# Patient Record
Sex: MALE | Race: WHITE | NOT HISPANIC OR LATINO | Employment: FULL TIME | URBAN - METROPOLITAN AREA
[De-identification: names, ages, dates, MRNs, and addresses within clinical notes are randomized per-mention and may not be internally consistent; named-entity substitution may affect disease eponyms.]

---

## 2021-11-22 ENCOUNTER — OFFICE VISIT (OUTPATIENT)
Dept: URGENT CARE | Facility: URGENT CARE | Age: 56
End: 2021-11-22
Payer: COMMERCIAL

## 2021-11-22 ENCOUNTER — ANCILLARY PROCEDURE (OUTPATIENT)
Dept: GENERAL RADIOLOGY | Facility: CLINIC | Age: 56
End: 2021-11-22
Attending: FAMILY MEDICINE
Payer: COMMERCIAL

## 2021-11-22 VITALS
BODY MASS INDEX: 28.62 KG/M2 | TEMPERATURE: 98 F | DIASTOLIC BLOOD PRESSURE: 72 MMHG | WEIGHT: 211 LBS | OXYGEN SATURATION: 94 % | RESPIRATION RATE: 18 BRPM | SYSTOLIC BLOOD PRESSURE: 114 MMHG | HEART RATE: 81 BPM

## 2021-11-22 DIAGNOSIS — R05.9 COUGH: ICD-10-CM

## 2021-11-22 DIAGNOSIS — J18.9 PNEUMONIA OF BOTH LUNGS DUE TO INFECTIOUS ORGANISM, UNSPECIFIED PART OF LUNG: Primary | ICD-10-CM

## 2021-11-22 PROCEDURE — 71046 X-RAY EXAM CHEST 2 VIEWS: CPT | Performed by: RADIOLOGY

## 2021-11-22 PROCEDURE — 99214 OFFICE O/P EST MOD 30 MIN: CPT | Performed by: FAMILY MEDICINE

## 2021-11-22 RX ORDER — DOXYCYCLINE HYCLATE 100 MG
100 TABLET ORAL 2 TIMES DAILY
Qty: 14 TABLET | Refills: 0 | Status: ON HOLD | OUTPATIENT
Start: 2021-11-22 | End: 2021-11-26

## 2021-11-22 NOTE — PROGRESS NOTES
ICD-10-CM    1. Pneumonia of both lungs due to infectious organism, unspecified part of lung  J18.9 doxycycline hyclate (VIBRA-TABS) 100 MG tablet     amoxicillin-clavulanate (AUGMENTIN) 875-125 MG tablet   2. Cough  R05.9 XR Chest 2 Views    probalby bacterial given negative covid. Recommended repeat covid but declined. Symptomatic therapy and follow up discussed. Use of OTC  meds. discussed   -------------------------------  Rafita SESAY Ihfe with presents with 14 days symptoms including uri symptoms with worse fatigue and cough the past week. No fever. No nausea or vomiting.     Tested with antigen and pcr both negative for covid.   Treatment measures tried include OTC Cough med.  Exposures--no  Recent travel--no    Current Outpatient Medications   Medication Sig Dispense Refill     IBUPROFEN 200 200 MG OR TABS 1 TABLET EVERY 4 TO 6 HOURS AS NEEDED (Patient not taking: No sig reported)         ROS otherwise negative for resp., ID,  HEENT symptoms.    Objective: /72 (BP Location: Right arm, Patient Position: Sitting, Cuff Size: Adult Large)   Pulse 81   Temp 98  F (36.7  C) (Tympanic)   Resp 18   Wt 95.7 kg (211 lb)   SpO2 94%   BMI 28.62 kg/m    Exam:  GENERAL APPEARANCE: healthy, alert and no distress  EYES: Eyes grossly normal to inspection  HENT: ear canals and TM's normal and nose and mouth without ulcers or lesions  NECK: no adenopathy, no asymmetry, masses, or scars and thyroid normal to palpation  RESP: lungs with crackles diffusely.   CV: regular rates and rhythm, no murmur    Xray with diffuse infiltrates.

## 2021-11-23 ENCOUNTER — APPOINTMENT (OUTPATIENT)
Dept: GENERAL RADIOLOGY | Facility: CLINIC | Age: 56
DRG: 177 | End: 2021-11-23
Attending: EMERGENCY MEDICINE
Payer: COMMERCIAL

## 2021-11-23 ENCOUNTER — HOSPITAL ENCOUNTER (INPATIENT)
Facility: CLINIC | Age: 56
LOS: 3 days | Discharge: HOME | DRG: 177 | End: 2021-11-26
Attending: EMERGENCY MEDICINE | Admitting: HOSPITALIST
Payer: COMMERCIAL

## 2021-11-23 ENCOUNTER — APPOINTMENT (OUTPATIENT)
Dept: CT IMAGING | Facility: CLINIC | Age: 56
DRG: 177 | End: 2021-11-23
Attending: EMERGENCY MEDICINE
Payer: COMMERCIAL

## 2021-11-23 DIAGNOSIS — J96.01 ACUTE RESPIRATORY FAILURE WITH HYPOXIA (H): ICD-10-CM

## 2021-11-23 DIAGNOSIS — I26.94 MULTIPLE SUBSEGMENTAL PULMONARY EMBOLI WITHOUT ACUTE COR PULMONALE (H): ICD-10-CM

## 2021-11-23 DIAGNOSIS — I26.99 OTHER ACUTE PULMONARY EMBOLISM WITHOUT ACUTE COR PULMONALE (H): Primary | ICD-10-CM

## 2021-11-23 DIAGNOSIS — I26.09 ACUTE PULMONARY EMBOLISM WITH ACUTE COR PULMONALE, UNSPECIFIED PULMONARY EMBOLISM TYPE (H): ICD-10-CM

## 2021-11-23 DIAGNOSIS — J12.82 PNEUMONIA DUE TO 2019 NOVEL CORONAVIRUS: ICD-10-CM

## 2021-11-23 DIAGNOSIS — U07.1 PNEUMONIA DUE TO 2019 NOVEL CORONAVIRUS: ICD-10-CM

## 2021-11-23 LAB
ALBUMIN SERPL-MCNC: 2.7 G/DL (ref 3.4–5)
ALP SERPL-CCNC: 92 U/L (ref 40–150)
ALT SERPL W P-5'-P-CCNC: 64 U/L (ref 0–70)
ANION GAP SERPL CALCULATED.3IONS-SCNC: 9 MMOL/L (ref 3–14)
APTT PPP: 33 SECONDS (ref 22–38)
AST SERPL W P-5'-P-CCNC: 59 U/L (ref 0–45)
BASE EXCESS BLDV CALC-SCNC: 1.1 MMOL/L (ref -7.7–1.9)
BASOPHILS # BLD AUTO: 0 10E3/UL (ref 0–0.2)
BASOPHILS NFR BLD AUTO: 0 %
BILIRUB SERPL-MCNC: 0.9 MG/DL (ref 0.2–1.3)
BUN SERPL-MCNC: 21 MG/DL (ref 7–30)
CALCIUM SERPL-MCNC: 8.9 MG/DL (ref 8.5–10.1)
CHLORIDE BLD-SCNC: 101 MMOL/L (ref 94–109)
CO2 SERPL-SCNC: 24 MMOL/L (ref 20–32)
CREAT SERPL-MCNC: 0.99 MG/DL (ref 0.66–1.25)
CRP SERPL-MCNC: 91.2 MG/L (ref 0–8)
EOSINOPHIL # BLD AUTO: 0.1 10E3/UL (ref 0–0.7)
EOSINOPHIL NFR BLD AUTO: 1 %
ERYTHROCYTE [DISTWIDTH] IN BLOOD BY AUTOMATED COUNT: 11.7 % (ref 10–15)
FERRITIN SERPL-MCNC: 954 NG/ML (ref 26–388)
GFR SERPL CREATININE-BSD FRML MDRD: 85 ML/MIN/1.73M2
GLUCOSE BLD-MCNC: 113 MG/DL (ref 70–99)
HCO3 BLDV-SCNC: 27 MMOL/L (ref 21–28)
HCT VFR BLD AUTO: 38.2 % (ref 40–53)
HGB BLD-MCNC: 13.2 G/DL (ref 13.3–17.7)
HOLD SPECIMEN: NORMAL
IMM GRANULOCYTES # BLD: 0 10E3/UL
IMM GRANULOCYTES NFR BLD: 0 %
INR PPP: 1.41 (ref 0.85–1.15)
LACTATE SERPL-SCNC: 1.2 MMOL/L (ref 0.7–2)
LACTATE SERPL-SCNC: 1.9 MMOL/L (ref 0.7–2)
LDH SERPL L TO P-CCNC: 344 U/L (ref 85–227)
LYMPHOCYTES # BLD AUTO: 0.4 10E3/UL (ref 0.8–5.3)
LYMPHOCYTES NFR BLD AUTO: 5 %
MAGNESIUM SERPL-MCNC: 2.7 MG/DL (ref 1.6–2.3)
MCH RBC QN AUTO: 31.1 PG (ref 26.5–33)
MCHC RBC AUTO-ENTMCNC: 34.6 G/DL (ref 31.5–36.5)
MCV RBC AUTO: 90 FL (ref 78–100)
MONOCYTES # BLD AUTO: 0.5 10E3/UL (ref 0–1.3)
MONOCYTES NFR BLD AUTO: 6 %
NEUTROPHILS # BLD AUTO: 7.8 10E3/UL (ref 1.6–8.3)
NEUTROPHILS NFR BLD AUTO: 88 %
NRBC # BLD AUTO: 0 10E3/UL
NRBC BLD AUTO-RTO: 0 /100
O2/TOTAL GAS SETTING VFR VENT: 2 %
PCO2 BLDV: 44 MM HG (ref 40–50)
PH BLDV: 7.39 [PH] (ref 7.32–7.43)
PHOSPHATE SERPL-MCNC: 3.9 MG/DL (ref 2.5–4.5)
PLATELET # BLD AUTO: 259 10E3/UL (ref 150–450)
PO2 BLDV: 18 MM HG (ref 25–47)
POTASSIUM BLD-SCNC: 4.5 MMOL/L (ref 3.4–5.3)
PROT SERPL-MCNC: 8.3 G/DL (ref 6.8–8.8)
RADIOLOGIST FLAGS: ABNORMAL
RBC # BLD AUTO: 4.25 10E6/UL (ref 4.4–5.9)
SARS-COV-2 RNA RESP QL NAA+PROBE: POSITIVE
SODIUM SERPL-SCNC: 134 MMOL/L (ref 133–144)
TROPONIN I SERPL HS-MCNC: 2695 NG/L
TROPONIN I SERPL-MCNC: 2.93 UG/L (ref 0–0.04)
WBC # BLD AUTO: 8.9 10E3/UL (ref 4–11)

## 2021-11-23 PROCEDURE — XW043E5 INTRODUCTION OF REMDESIVIR ANTI-INFECTIVE INTO CENTRAL VEIN, PERCUTANEOUS APPROACH, NEW TECHNOLOGY GROUP 5: ICD-10-PCS | Performed by: FAMILY MEDICINE

## 2021-11-23 PROCEDURE — 36415 COLL VENOUS BLD VENIPUNCTURE: CPT | Performed by: EMERGENCY MEDICINE

## 2021-11-23 PROCEDURE — 71046 X-RAY EXAM CHEST 2 VIEWS: CPT

## 2021-11-23 PROCEDURE — 99285 EMERGENCY DEPT VISIT HI MDM: CPT | Performed by: EMERGENCY MEDICINE

## 2021-11-23 PROCEDURE — 999N000123 HC STATISTIC OXYGEN O2DAILY TECH TIME

## 2021-11-23 PROCEDURE — 250N000011 HC RX IP 250 OP 636: Performed by: EMERGENCY MEDICINE

## 2021-11-23 PROCEDURE — 96361 HYDRATE IV INFUSION ADD-ON: CPT | Performed by: EMERGENCY MEDICINE

## 2021-11-23 PROCEDURE — 120N000001 HC R&B MED SURG/OB

## 2021-11-23 PROCEDURE — 93005 ELECTROCARDIOGRAM TRACING: CPT | Performed by: EMERGENCY MEDICINE

## 2021-11-23 PROCEDURE — 87186 SC STD MICRODIL/AGAR DIL: CPT | Performed by: EMERGENCY MEDICINE

## 2021-11-23 PROCEDURE — 84145 PROCALCITONIN (PCT): CPT | Performed by: EMERGENCY MEDICINE

## 2021-11-23 PROCEDURE — 96368 THER/DIAG CONCURRENT INF: CPT | Performed by: EMERGENCY MEDICINE

## 2021-11-23 PROCEDURE — 83615 LACTATE (LD) (LDH) ENZYME: CPT | Performed by: EMERGENCY MEDICINE

## 2021-11-23 PROCEDURE — 84484 ASSAY OF TROPONIN QUANT: CPT | Performed by: EMERGENCY MEDICINE

## 2021-11-23 PROCEDURE — 85379 FIBRIN DEGRADATION QUANT: CPT | Performed by: EMERGENCY MEDICINE

## 2021-11-23 PROCEDURE — 71275 CT ANGIOGRAPHY CHEST: CPT

## 2021-11-23 PROCEDURE — 96365 THER/PROPH/DIAG IV INF INIT: CPT | Mod: 59 | Performed by: EMERGENCY MEDICINE

## 2021-11-23 PROCEDURE — 96375 TX/PRO/DX INJ NEW DRUG ADDON: CPT | Performed by: EMERGENCY MEDICINE

## 2021-11-23 PROCEDURE — 258N000003 HC RX IP 258 OP 636: Performed by: EMERGENCY MEDICINE

## 2021-11-23 PROCEDURE — 83605 ASSAY OF LACTIC ACID: CPT | Performed by: EMERGENCY MEDICINE

## 2021-11-23 PROCEDURE — 82040 ASSAY OF SERUM ALBUMIN: CPT | Performed by: EMERGENCY MEDICINE

## 2021-11-23 PROCEDURE — 82247 BILIRUBIN TOTAL: CPT | Performed by: EMERGENCY MEDICINE

## 2021-11-23 PROCEDURE — C9803 HOPD COVID-19 SPEC COLLECT: HCPCS | Performed by: EMERGENCY MEDICINE

## 2021-11-23 PROCEDURE — 83735 ASSAY OF MAGNESIUM: CPT | Performed by: EMERGENCY MEDICINE

## 2021-11-23 PROCEDURE — 96366 THER/PROPH/DIAG IV INF ADDON: CPT | Performed by: EMERGENCY MEDICINE

## 2021-11-23 PROCEDURE — 85730 THROMBOPLASTIN TIME PARTIAL: CPT | Performed by: EMERGENCY MEDICINE

## 2021-11-23 PROCEDURE — 82803 BLOOD GASES ANY COMBINATION: CPT | Performed by: EMERGENCY MEDICINE

## 2021-11-23 PROCEDURE — 999N000215 HC STATISTIC HFNC ADULT NON-CPAP

## 2021-11-23 PROCEDURE — 85025 COMPLETE CBC W/AUTO DIFF WBC: CPT | Performed by: EMERGENCY MEDICINE

## 2021-11-23 PROCEDURE — 84100 ASSAY OF PHOSPHORUS: CPT | Performed by: EMERGENCY MEDICINE

## 2021-11-23 PROCEDURE — 82728 ASSAY OF FERRITIN: CPT | Performed by: EMERGENCY MEDICINE

## 2021-11-23 PROCEDURE — 85610 PROTHROMBIN TIME: CPT | Performed by: EMERGENCY MEDICINE

## 2021-11-23 PROCEDURE — 86140 C-REACTIVE PROTEIN: CPT | Performed by: EMERGENCY MEDICINE

## 2021-11-23 PROCEDURE — 999N000157 HC STATISTIC RCP TIME EA 10 MIN

## 2021-11-23 PROCEDURE — 87635 SARS-COV-2 COVID-19 AMP PRB: CPT | Performed by: EMERGENCY MEDICINE

## 2021-11-23 PROCEDURE — 250N000009 HC RX 250: Performed by: EMERGENCY MEDICINE

## 2021-11-23 PROCEDURE — 99285 EMERGENCY DEPT VISIT HI MDM: CPT | Mod: 25 | Performed by: EMERGENCY MEDICINE

## 2021-11-23 PROCEDURE — 87149 DNA/RNA DIRECT PROBE: CPT | Performed by: EMERGENCY MEDICINE

## 2021-11-23 RX ORDER — NALOXONE HYDROCHLORIDE 0.4 MG/ML
0.2 INJECTION, SOLUTION INTRAMUSCULAR; INTRAVENOUS; SUBCUTANEOUS
Status: CANCELLED | OUTPATIENT
Start: 2021-11-23

## 2021-11-23 RX ORDER — DROPERIDOL 2.5 MG/ML
1.25 INJECTION, SOLUTION INTRAMUSCULAR; INTRAVENOUS ONCE
Status: COMPLETED | OUTPATIENT
Start: 2021-11-23 | End: 2021-11-23

## 2021-11-23 RX ORDER — DEXAMETHASONE SODIUM PHOSPHATE 10 MG/ML
10 INJECTION, SOLUTION INTRAMUSCULAR; INTRAVENOUS ONCE
Status: DISCONTINUED | OUTPATIENT
Start: 2021-11-23 | End: 2021-11-23

## 2021-11-23 RX ORDER — NALOXONE HYDROCHLORIDE 0.4 MG/ML
0.4 INJECTION, SOLUTION INTRAMUSCULAR; INTRAVENOUS; SUBCUTANEOUS
Status: CANCELLED | OUTPATIENT
Start: 2021-11-23

## 2021-11-23 RX ORDER — IOPAMIDOL 755 MG/ML
78 INJECTION, SOLUTION INTRAVASCULAR ONCE
Status: COMPLETED | OUTPATIENT
Start: 2021-11-23 | End: 2021-11-23

## 2021-11-23 RX ORDER — OXYCODONE HYDROCHLORIDE 5 MG/1
5-10 TABLET ORAL
Status: CANCELLED | OUTPATIENT
Start: 2021-11-23

## 2021-11-23 RX ORDER — ONDANSETRON 2 MG/ML
4 INJECTION INTRAMUSCULAR; INTRAVENOUS EVERY 6 HOURS PRN
Status: CANCELLED | OUTPATIENT
Start: 2021-11-23

## 2021-11-23 RX ORDER — HYDROMORPHONE HYDROCHLORIDE 1 MG/ML
.3-.5 INJECTION, SOLUTION INTRAMUSCULAR; INTRAVENOUS; SUBCUTANEOUS
Status: CANCELLED | OUTPATIENT
Start: 2021-11-23

## 2021-11-23 RX ORDER — HEPARIN SODIUM 10000 [USP'U]/100ML
0-5000 INJECTION, SOLUTION INTRAVENOUS CONTINUOUS
Status: DISCONTINUED | OUTPATIENT
Start: 2021-11-23 | End: 2021-11-25

## 2021-11-23 RX ORDER — DEXAMETHASONE SODIUM PHOSPHATE 10 MG/ML
10 INJECTION, SOLUTION INTRAMUSCULAR; INTRAVENOUS EVERY 24 HOURS
Status: DISCONTINUED | OUTPATIENT
Start: 2021-11-23 | End: 2021-11-26 | Stop reason: HOSPADM

## 2021-11-23 RX ORDER — ONDANSETRON 4 MG/1
4 TABLET, ORALLY DISINTEGRATING ORAL EVERY 6 HOURS PRN
Status: CANCELLED | OUTPATIENT
Start: 2021-11-23

## 2021-11-23 RX ADMIN — REMDESIVIR 200 MG: 100 INJECTION, POWDER, LYOPHILIZED, FOR SOLUTION INTRAVENOUS at 22:48

## 2021-11-23 RX ADMIN — HEPARIN SODIUM 1650 UNITS/HR: 10000 INJECTION, SOLUTION INTRAVENOUS at 22:42

## 2021-11-23 RX ADMIN — HYDROMORPHONE HYDROCHLORIDE 1 MG: 1 INJECTION, SOLUTION INTRAMUSCULAR; INTRAVENOUS; SUBCUTANEOUS at 20:54

## 2021-11-23 RX ADMIN — DROPERIDOL 1.25 MG: 2.5 INJECTION, SOLUTION INTRAMUSCULAR; INTRAVENOUS at 20:52

## 2021-11-23 RX ADMIN — SODIUM CHLORIDE, POTASSIUM CHLORIDE, SODIUM LACTATE AND CALCIUM CHLORIDE 1000 ML: 600; 310; 30; 20 INJECTION, SOLUTION INTRAVENOUS at 20:51

## 2021-11-23 RX ADMIN — IOPAMIDOL 78 ML: 755 INJECTION, SOLUTION INTRAVENOUS at 21:08

## 2021-11-23 RX ADMIN — DEXAMETHASONE SODIUM PHOSPHATE 10 MG: 10 INJECTION INTRAMUSCULAR; INTRAVENOUS at 23:29

## 2021-11-23 RX ADMIN — SODIUM CHLORIDE, PRESERVATIVE FREE 50 ML: 5 INJECTION INTRAVENOUS at 23:29

## 2021-11-23 RX ADMIN — SODIUM CHLORIDE 100 ML: 9 INJECTION, SOLUTION INTRAVENOUS at 21:08

## 2021-11-23 ASSESSMENT — ACTIVITIES OF DAILY LIVING (ADL): ADLS_ACUITY_SCORE: 7

## 2021-11-24 ENCOUNTER — HOSPITAL ENCOUNTER (OUTPATIENT)
Age: 56
End: 2021-11-24
Payer: COMMERCIAL

## 2021-11-24 ENCOUNTER — APPOINTMENT (OUTPATIENT)
Dept: CARDIOLOGY | Facility: CLINIC | Age: 56
DRG: 177 | End: 2021-11-24
Attending: EMERGENCY MEDICINE
Payer: COMMERCIAL

## 2021-11-24 PROBLEM — I26.94 MULTIPLE SUBSEGMENTAL PULMONARY EMBOLI WITHOUT ACUTE COR PULMONALE (H): Status: ACTIVE | Noted: 2021-11-24

## 2021-11-24 LAB
ALBUMIN SERPL-MCNC: 2.4 G/DL (ref 3.4–5)
ALP SERPL-CCNC: 86 U/L (ref 40–150)
ALT SERPL W P-5'-P-CCNC: 49 U/L (ref 0–70)
ANION GAP SERPL CALCULATED.3IONS-SCNC: 9 MMOL/L (ref 3–14)
AST SERPL W P-5'-P-CCNC: 30 U/L (ref 0–45)
BILIRUB SERPL-MCNC: 0.6 MG/DL (ref 0.2–1.3)
BUN SERPL-MCNC: 22 MG/DL (ref 7–30)
CALCIUM SERPL-MCNC: 8.7 MG/DL (ref 8.5–10.1)
CHLORIDE BLD-SCNC: 106 MMOL/L (ref 94–109)
CO2 SERPL-SCNC: 23 MMOL/L (ref 20–32)
CREAT SERPL-MCNC: 0.87 MG/DL (ref 0.66–1.25)
CRP SERPL-MCNC: 90.6 MG/L (ref 0–8)
D DIMER PPP FEU-MCNC: >20 UG/ML FEU (ref 0–0.5)
ERYTHROCYTE [DISTWIDTH] IN BLOOD BY AUTOMATED COUNT: 11.4 % (ref 10–15)
GFR SERPL CREATININE-BSD FRML MDRD: >90 ML/MIN/1.73M2
GLUCOSE BLD-MCNC: 145 MG/DL (ref 70–99)
HCT VFR BLD AUTO: 41.9 % (ref 40–53)
HGB BLD-MCNC: 14.7 G/DL (ref 13.3–17.7)
HOLD SPECIMEN: NORMAL
HOLD SPECIMEN: NORMAL
LVEF ECHO: NORMAL
MCH RBC QN AUTO: 31.3 PG (ref 26.5–33)
MCHC RBC AUTO-ENTMCNC: 35.1 G/DL (ref 31.5–36.5)
MCV RBC AUTO: 89 FL (ref 78–100)
NT-PROBNP SERPL-MCNC: 1438 PG/ML (ref 0–900)
PLATELET # BLD AUTO: 349 10E3/UL (ref 150–450)
POTASSIUM BLD-SCNC: 4.2 MMOL/L (ref 3.4–5.3)
PROCALCITONIN SERPL-MCNC: 0.08 NG/ML
PROT SERPL-MCNC: 7.6 G/DL (ref 6.8–8.8)
RBC # BLD AUTO: 4.7 10E6/UL (ref 4.4–5.9)
SODIUM SERPL-SCNC: 138 MMOL/L (ref 133–144)
TROPONIN I SERPL HS-MCNC: 1278 NG/L
TROPONIN I SERPL HS-MCNC: 970 NG/L
TROPONIN I SERPL-MCNC: 1.02 UG/L (ref 0–0.04)
TROPONIN I SERPL-MCNC: 1.45 UG/L (ref 0–0.04)
UFH PPP CHRO-ACNC: 0.2 IU/ML
UFH PPP CHRO-ACNC: 0.59 IU/ML
UFH PPP CHRO-ACNC: 0.81 IU/ML
WBC # BLD AUTO: 13.6 10E3/UL (ref 4–11)

## 2021-11-24 PROCEDURE — 85520 HEPARIN ASSAY: CPT | Performed by: FAMILY MEDICINE

## 2021-11-24 PROCEDURE — 36415 COLL VENOUS BLD VENIPUNCTURE: CPT | Performed by: EMERGENCY MEDICINE

## 2021-11-24 PROCEDURE — 84484 ASSAY OF TROPONIN QUANT: CPT | Performed by: HOSPITALIST

## 2021-11-24 PROCEDURE — 85520 HEPARIN ASSAY: CPT | Performed by: EMERGENCY MEDICINE

## 2021-11-24 PROCEDURE — 258N000003 HC RX IP 258 OP 636: Performed by: EMERGENCY MEDICINE

## 2021-11-24 PROCEDURE — 87040 BLOOD CULTURE FOR BACTERIA: CPT | Performed by: EMERGENCY MEDICINE

## 2021-11-24 PROCEDURE — 83880 ASSAY OF NATRIURETIC PEPTIDE: CPT | Performed by: EMERGENCY MEDICINE

## 2021-11-24 PROCEDURE — 84484 ASSAY OF TROPONIN QUANT: CPT | Performed by: FAMILY MEDICINE

## 2021-11-24 PROCEDURE — 99223 1ST HOSP IP/OBS HIGH 75: CPT | Mod: AI | Performed by: HOSPITALIST

## 2021-11-24 PROCEDURE — 999N000157 HC STATISTIC RCP TIME EA 10 MIN

## 2021-11-24 PROCEDURE — 80053 COMPREHEN METABOLIC PANEL: CPT | Performed by: HOSPITALIST

## 2021-11-24 PROCEDURE — 255N000002 HC RX 255 OP 636: Performed by: EMERGENCY MEDICINE

## 2021-11-24 PROCEDURE — 36415 COLL VENOUS BLD VENIPUNCTURE: CPT | Performed by: FAMILY MEDICINE

## 2021-11-24 PROCEDURE — 250N000009 HC RX 250: Performed by: EMERGENCY MEDICINE

## 2021-11-24 PROCEDURE — 93306 TTE W/DOPPLER COMPLETE: CPT | Mod: 26 | Performed by: INTERNAL MEDICINE

## 2021-11-24 PROCEDURE — 999N000123 HC STATISTIC OXYGEN O2DAILY TECH TIME

## 2021-11-24 PROCEDURE — 36415 COLL VENOUS BLD VENIPUNCTURE: CPT | Performed by: HOSPITALIST

## 2021-11-24 PROCEDURE — 250N000011 HC RX IP 250 OP 636: Performed by: HOSPITALIST

## 2021-11-24 PROCEDURE — 250N000011 HC RX IP 250 OP 636: Performed by: EMERGENCY MEDICINE

## 2021-11-24 PROCEDURE — 120N000001 HC R&B MED SURG/OB

## 2021-11-24 PROCEDURE — 86140 C-REACTIVE PROTEIN: CPT | Performed by: HOSPITALIST

## 2021-11-24 PROCEDURE — 85027 COMPLETE CBC AUTOMATED: CPT | Performed by: HOSPITALIST

## 2021-11-24 PROCEDURE — 999N000208 ECHOCARDIOGRAM COMPLETE

## 2021-11-24 RX ORDER — ONDANSETRON 4 MG/1
4 TABLET, ORALLY DISINTEGRATING ORAL EVERY 6 HOURS PRN
Status: DISCONTINUED | OUTPATIENT
Start: 2021-11-24 | End: 2021-11-26 | Stop reason: HOSPADM

## 2021-11-24 RX ORDER — LIDOCAINE 40 MG/G
CREAM TOPICAL
Status: DISCONTINUED | OUTPATIENT
Start: 2021-11-24 | End: 2021-11-26 | Stop reason: HOSPADM

## 2021-11-24 RX ORDER — PROCHLORPERAZINE 25 MG
25 SUPPOSITORY, RECTAL RECTAL EVERY 12 HOURS PRN
Status: DISCONTINUED | OUTPATIENT
Start: 2021-11-24 | End: 2021-11-26 | Stop reason: HOSPADM

## 2021-11-24 RX ORDER — PROCHLORPERAZINE MALEATE 5 MG
10 TABLET ORAL EVERY 6 HOURS PRN
Status: DISCONTINUED | OUTPATIENT
Start: 2021-11-24 | End: 2021-11-26 | Stop reason: HOSPADM

## 2021-11-24 RX ORDER — ONDANSETRON 2 MG/ML
4 INJECTION INTRAMUSCULAR; INTRAVENOUS EVERY 6 HOURS PRN
Status: DISCONTINUED | OUTPATIENT
Start: 2021-11-24 | End: 2021-11-26 | Stop reason: HOSPADM

## 2021-11-24 RX ADMIN — DEXAMETHASONE SODIUM PHOSPHATE 10 MG: 10 INJECTION INTRAMUSCULAR; INTRAVENOUS at 21:34

## 2021-11-24 RX ADMIN — HEPARIN SODIUM 1550 UNITS/HR: 10000 INJECTION, SOLUTION INTRAVENOUS at 11:41

## 2021-11-24 RX ADMIN — HUMAN ALBUMIN MICROSPHERES AND PERFLUTREN 2 ML: 10; .22 INJECTION, SOLUTION INTRAVENOUS at 11:19

## 2021-11-24 RX ADMIN — SODIUM CHLORIDE 50 ML: 9 INJECTION, SOLUTION INTRAVENOUS at 21:34

## 2021-11-24 RX ADMIN — REMDESIVIR 100 MG: 100 INJECTION, POWDER, LYOPHILIZED, FOR SOLUTION INTRAVENOUS at 19:40

## 2021-11-24 ASSESSMENT — ACTIVITIES OF DAILY LIVING (ADL)
ADLS_ACUITY_SCORE: 7
ADLS_ACUITY_SCORE: 5
ADLS_ACUITY_SCORE: 7
ADLS_ACUITY_SCORE: 5
ADLS_ACUITY_SCORE: 7
ADLS_ACUITY_SCORE: 3
ADLS_ACUITY_SCORE: 3
ADLS_ACUITY_SCORE: 7
ADLS_ACUITY_SCORE: 7

## 2021-11-24 ASSESSMENT — MIFFLIN-ST. JEOR: SCORE: 1845

## 2021-11-24 NOTE — ED NOTES
MD asked RN to call RT to place patient back on hi babak NC. Patient refusing hiflo at this time and wants to stay on O2 via NC. Will continue to monitor.

## 2021-11-24 NOTE — ED PROVIDER NOTES
"     Emergency Department Patient Sign-out       Brief HPI:  This is a 56 year old male signed out to me by Dr. Leal .  See initial ED Provider note for details of the presentation.            Significant Events prior to my assuming care: 56-year-old male past medical history reviewed, discussed with outgoing provider with diagnosis of Covid pneumonia now on high flow at 40/0.4 FiO2 has received dexamethasone and remdesivir. Also on CT submassive PE discussed with the ED team at the [place]. Recommend heparinization only and no intervention with IR or thrombolytics at this point. Echo in the a.m. Heparinize patient.        Exam:   Patient Vitals for the past 24 hrs:   BP Temp Temp src Pulse Resp SpO2 Weight   11/23/21 2300 126/85 -- -- 100 18 95 % --   11/23/21 2245 -- -- -- 103 -- 92 % --   11/23/21 2230 (!) 124/96 -- -- 108 18 94 % --   11/23/21 2200 (!) 146/94 -- -- 105 18 98 % --   11/23/21 2145 -- -- -- -- -- 98 % --   11/23/21 2142 -- -- -- -- -- 97 % --   11/23/21 2130 (!) 143/94 -- -- 102 18 93 % --   11/23/21 2115 (!) 137/90 -- -- 101 18 -- --   11/23/21 2100 -- -- -- -- -- 97 % --   11/23/21 2015 -- -- -- -- -- 92 % --   11/23/21 2000 -- -- -- -- -- 95 % --   11/23/21 1915 -- -- -- -- -- 94 % --   11/23/21 1900 (!) 137/95 -- -- -- -- 95 % --   11/23/21 1849 (!) 136/90 98.6  F (37  C) Tympanic 119 28 -- 90.7 kg (200 lb)           ED RESULTS:   Results for orders placed or performed during the hospital encounter of 11/23/21 (from the past 24 hour(s))   Chest XR,  PA & LAT     Status: None    Collection Time: 11/23/21  7:35 PM    Narrative    EXAM: XR CHEST 2 VW  LOCATION: [facility]  DATE/TIME: 11/23/2021 7:30 PM    INDICATION: \"pneumonia\" , evalaute for pneumothorax. Rib pain.  COMPARISON: 11/22/2021      Impression    IMPRESSION: No significant change. Patchy airspace infiltrates present bilaterally, left worse than right consistent with pneumonia and very consistent with " COVID pneumonia. There is no pneumothorax or pleural effusion evident. Heart size normal. No   fractures identified.   Comprehensive metabolic panel     Status: Abnormal    Collection Time: 11/23/21  8:42 PM   Result Value Ref Range    Sodium 134 133 - 144 mmol/L    Potassium 4.5 3.4 - 5.3 mmol/L    Chloride 101 94 - 109 mmol/L    Carbon Dioxide (CO2) 24 20 - 32 mmol/L    Anion Gap 9 3 - 14 mmol/L    Urea Nitrogen 21 7 - 30 mg/dL    Creatinine 0.99 0.66 - 1.25 mg/dL    Calcium 8.9 8.5 - 10.1 mg/dL    Glucose 113 (H) 70 - 99 mg/dL    Alkaline Phosphatase 92 40 - 150 U/L    AST 59 (H) 0 - 45 U/L    ALT 64 0 - 70 U/L    Protein Total 8.3 6.8 - 8.8 g/dL    Albumin 2.7 (L) 3.4 - 5.0 g/dL    Bilirubin Total 0.9 0.2 - 1.3 mg/dL    GFR Estimate 85 >60 mL/min/1.73m2   Blood gas venous     Status: Abnormal    Collection Time: 11/23/21  8:42 PM   Result Value Ref Range    pH Venous 7.39 7.32 - 7.43    pCO2 Venous 44 40 - 50 mm Hg    pO2 Venous 18 (L) 25 - 47 mm Hg    Bicarbonate Venous 27 21 - 28 mmol/L    Base Excess/Deficit (+/-) 1.1 -7.7 - 1.9 mmol/L    FIO2 2    Lactic acid whole blood     Status: Normal    Collection Time: 11/23/21  8:42 PM   Result Value Ref Range    Lactic Acid 1.9 0.7 - 2.0 mmol/L   Magnesium     Status: Abnormal    Collection Time: 11/23/21  8:42 PM   Result Value Ref Range    Magnesium 2.7 (H) 1.6 - 2.3 mg/dL   Troponin I     Status: Abnormal    Collection Time: 11/23/21  8:42 PM   Result Value Ref Range    Troponin I S 2,695 (HH) <79 ng/L   Phosphorus     Status: Normal    Collection Time: 11/23/21  8:42 PM   Result Value Ref Range    Phosphorus 3.9 2.5 - 4.5 mg/dL   CRP inflammation     Status: Abnormal    Collection Time: 11/23/21  8:42 PM   Result Value Ref Range    CRP Inflammation 91.2 (H) 0.0 - 8.0 mg/L   Ferritin     Status: Abnormal    Collection Time: 11/23/21  8:42 PM   Result Value Ref Range    Ferritin 954 (H) 26 - 388 ng/mL   Troponin I     Status: Abnormal    Collection Time: 11/23/21   8:42 PM   Result Value Ref Range    Troponin I 2.934 (HH) 0.000 - 0.045 ug/L   Symptomatic COVID-19 Virus (Coronavirus) by PCR Nasopharyngeal     Status: Abnormal    Collection Time: 11/23/21  8:45 PM    Specimen: Nasopharyngeal; Swab   Result Value Ref Range    SARS CoV2 PCR Positive (A) Negative    Narrative    Testing was performed using the chika  SARS-CoV-2 & Influenza A/B Assay on the chika  Brenda  System.  This test should be ordered for the detection of SARS-COV-2 in individuals who meet SARS-CoV-2 clinical and/or epidemiological criteria. Test performance is unknown in asymptomatic patients.  This test is for in vitro diagnostic use under the FDA EUA for laboratories certified under CLIA to perform moderate and/or high complexity testing. This test has not been FDA cleared or approved.  A negative test does not rule out the presence of PCR inhibitors in the specimen or target RNA in concentration below the limit of detection for the assay. The possibility of a false negative should be considered if the patient's recent exposure or clinical presentation suggests COVID-19.  [facility] are certified under the Clinical Laboratory Improvement Amendments of 1988 (CLIA-88) as qualified to perform moderate and/or high complexity laboratory testing.   CBC with platelets differential     Status: Abnormal    Collection Time: 11/23/21  9:24 PM    Narrative    The following orders were created for panel order CBC with platelets differential.  Procedure                               Abnormality         Status                     ---------                               -----------         ------                     CBC with platelets and d...[[id number]]  Abnormal            Final result                 Please view results for these tests on the individual orders.   CBC with platelets and differential     Status: Abnormal    Collection Time: 11/23/21  9:24 PM   Result Value Ref Range    WBC Count 8.9 4.0  - 11.0 10e3/uL    RBC Count 4.25 (L) 4.40 - 5.90 10e6/uL    Hemoglobin 13.2 (L) 13.3 - 17.7 g/dL    Hematocrit 38.2 (L) 40.0 - 53.0 %    MCV 90 78 - 100 fL    MCH 31.1 26.5 - 33.0 pg    MCHC 34.6 31.5 - 36.5 g/dL    RDW 11.7 10.0 - 15.0 %    Platelet Count 259 150 - 450 10e3/uL    % Neutrophils 88 %    % Lymphocytes 5 %    % Monocytes 6 %    % Eosinophils 1 %    % Basophils 0 %    % Immature Granulocytes 0 %    NRBCs per 100 WBC 0 <1 /100    Absolute Neutrophils 7.8 1.6 - 8.3 10e3/uL    Absolute Lymphocytes 0.4 (L) 0.8 - 5.3 10e3/uL    Absolute Monocytes 0.5 0.0 - 1.3 10e3/uL    Absolute Eosinophils 0.1 0.0 - 0.7 10e3/uL    Absolute Basophils 0.0 0.0 - 0.2 10e3/uL    Absolute Immature Granulocytes 0.0 <=0.0 10e3/uL    Absolute NRBCs 0.0 10e3/uL   CT Chest Pulmonary Embolism w Contrast     Status: Abnormal (Preliminary result)    Collection Time: 11/23/21  9:25 PM   Result Value Ref Range    Radiologist flags Pulmonary embolism (AA)     Narrative    EXAM: CT CHEST PULMONARY EMBOLISM W CONTRAST  LOCATION: [facility]  DATE/TIME: 11/23/2021 9:07 PM    INDICATION: Severe left lower chest pain, pneumonia on CXR.  COMPARISON: None.  TECHNIQUE: CT chest pulmonary angiogram during arterial phase injection of IV contrast. Multiplanar reformats and MIP reconstructions were performed. Dose reduction techniques were used.   CONTRAST: 78 mL Isovue 370.    FINDINGS:  ANGIOGRAM CHEST: Fairly extensive bilateral pulmonary emboli including first-order branches of the right and left pulmonary arteries. Thoracic aorta is negative for dissection. There is flattening of the interventricular septum concerning for right heart   strain.    LUNGS AND PLEURA: Moderately extensive infiltrates. No effusions.    MEDIASTINUM/AXILLAE: No adenopathy or aneurysm.    CORONARY ARTERY CALCIFICATION: None.    UPPER ABDOMEN: No acute findings.    MUSCULOSKELETAL: No frankly destructive bony lesions.      Impression     IMPRESSION:  1.  Positive study for pulmonary emboli, large clot burden.  2.  Moderately extensive infiltrates.      [Critical Result: Pulmonary embolism]    Finding was identified on 11/23/2021 9:31 PM.     Dr. Leal was contacted by me on 11/23/2021 9:36 PM and verbalized understanding of the critical result.    Partial thromboplastin time     Status: Normal    Collection Time: 11/23/21 10:26 PM   Result Value Ref Range    aPTT 33 22 - 38 Seconds   Lactic acid whole blood     Status: Normal    Collection Time: 11/23/21 10:26 PM   Result Value Ref Range    Lactic Acid 1.2 0.7 - 2.0 mmol/L   Lactate Dehydrogenase     Status: Abnormal    Collection Time: 11/23/21 10:26 PM   Result Value Ref Range    Lactate Dehydrogenase 344 (H) 85 - 227 U/L   INR     Status: Abnormal    Collection Time: 11/23/21 10:26 PM   Result Value Ref Range    INR 1.41 (H) 0.85 - 1.15   [facility] Draw     Status: None (In process)    Collection Time: 11/23/21 10:26 PM    Narrative    The following orders were created for panel order [facility] Draw.  Procedure                               Abnormality         Status                     ---------                               -----------         ------                     Extra Red Top Tube[[id number]]                               In process                 Extra Serum Separator Tu...[[id number]]                      In process                 Extra Purple Top Tube[[id number]]                            In process                   Please view results for these tests on the individual orders.       ED MEDICATIONS:   Medications   remdesivir 200 mg in sodium chloride 0.9 % 250 mL intermittent infusion (0 mg Intravenous Stopped 11/23/21 2328)     Followed by   0.9% sodium chloride BOLUS (50 mLs Intravenous New Bag 11/23/21 2329)   remdesivir 100 mg in sodium chloride 0.9 % 250 mL intermittent infusion (has no administration in time range)     And   0.9% sodium chloride BOLUS (has no administration in  time range)   dexamethasone PF (DECADRON) injection 10 mg (10 mg Intravenous Given 11/23/21 2329)   heparin 25,000 units in 0.45% NaCl 250 mL ANTICOAGULANT infusion (1,650 Units/hr Intravenous New Bag 11/23/21 2242)   sodium chloride (PF) 0.9% PF flush 3 mL (has no administration in time range)   sodium chloride (PF) 0.9% PF flush 3 mL (has no administration in time range)   lactated ringers BOLUS 1,000 mL (0 mLs Intravenous Stopped 11/23/21 2221)   droperidol (INAPSINE) injection 1.25 mg (1.25 mg Intravenous Given 11/23/21 2052)   HYDROmorphone (DILAUDID) injection 1 mg (1 mg Intravenous Given 11/23/21 2054)   iopamidol (ISOVUE-370) solution 78 mL (78 mLs Intravenous Given 11/23/21 2108)   sodium chloride 0.9 % bag 500mL for CT scan flush use (100 mLs Intravenous Given 11/23/21 2108)   heparin ANTICOAGULANT Loading dose for HIGH INTENSITY TREATMENT * Give BEFORE starting heparin infusion (7,250 Units Intravenous Given 11/23/21 2229)         Impression:    ICD-10-CM    1. Pneumonia due to 2019 novel coronavirus  U07.1     J12.82    2. Acute respiratory failure with hypoxia (H)  J96.01    3. Acute pulmonary embolism with acute cor pulmonale, unspecified pulmonary embolism type (H)  I26.09     Bilateral segmental and subsegmental pulmonary emboli with right-sided heart strain       Plan:    No Pending studies,  awaiting bed..        MD Marques Horta Kevin Ronald, MD  11/28/21 1300

## 2021-11-24 NOTE — ED PROVIDER NOTES
Emergency Department Patient Sign-out       Brief HPI:  This is a 56 year old male signed out to me by Dr. Mohan .  See initial ED Provider note for details of the presentation.            Significant Events prior to my assuming care: 56 year old male not vaccinated for Covid with SARS-CoV-2 infection.  On high flow oxygen at 40/0.04.  He received dexamethasone and remdesivir.  CT scan with submassive PE.  This was discussed with the PERT team who recommends heparinization.  Echocardiogram in a.m.      Exam:   Patient Vitals for the past 24 hrs:   BP Temp Temp src Pulse Resp SpO2 Weight   11/24/21 1500 (!) 169/89 -- -- 93 21 99 % --   11/24/21 1400 -- -- -- 80 24 98 % --   11/24/21 1305 -- -- -- 70 25 98 % --   11/24/21 1300 (!) 135/90 -- -- 75 (!) 46 96 % --   11/24/21 1230 (!) 140/92 -- -- 79 24 98 % --   11/24/21 1200 (!) 142/107 -- -- 76 22 98 % --   11/24/21 1140 -- -- -- 68 20 98 % --   11/24/21 1130 123/78 -- -- 75 29 99 % --   11/24/21 1100 138/82 -- -- 78 23 98 % --   11/24/21 1045 -- -- -- 79 -- 97 % --   11/24/21 1030 122/88 -- -- 75 -- 98 % --   11/24/21 1015 -- -- -- 89 -- 97 % --   11/24/21 1000 107/76 -- -- 83 -- 98 % --   11/24/21 0930 (!) 127/103 -- -- 83 21 97 % --   11/24/21 0915 -- -- -- 85 22 95 % --   11/24/21 0900 118/70 -- -- 67 22 94 % --   11/24/21 0845 -- -- -- 67 22 95 % --   11/24/21 0830 131/87 -- -- 88 (!) 31 97 % --   11/24/21 0815 -- -- -- 66 21 97 % --   11/24/21 0800 111/80 -- -- 70 21 98 % --   11/24/21 0700 -- -- -- 69 (!) 40 97 % --   11/24/21 0545 -- -- -- 98 20 97 % --   11/24/21 0530 -- -- -- 84 23 96 % --   11/24/21 0515 -- -- -- 77 14 96 % --   11/24/21 0500 (!) 134/92 -- -- 84 23 96 % --   11/24/21 0445 -- -- -- 78 24 95 % --   11/24/21 0430 131/89 -- -- 85 (!) 45 97 % --   11/24/21 0415 -- -- -- 77 22 95 % --   11/24/21 0400 116/85 -- -- 79 28 96 % --   11/24/21 0345 -- -- -- 79 24 97 % --   11/24/21 0330 118/79 -- -- 81 22 96 % --   11/24/21 0315 -- -- -- 84 (!)  "45 96 % --   11/24/21 0200 122/82 -- -- 86 22 97 % --   11/24/21 0100 (!) 132/92 -- -- 101 19 97 % --   11/24/21 0000 (!) 138/98 -- -- 98 25 97 % --   11/23/21 2330 (!) 140/96 -- -- 108 30 92 % --   11/23/21 2300 126/85 -- -- 100 18 95 % --   11/23/21 2245 -- -- -- 103 -- 92 % --   11/23/21 2230 (!) 124/96 -- -- 108 18 94 % --   11/23/21 2200 (!) 146/94 -- -- 105 18 98 % --   11/23/21 2145 -- -- -- -- -- 98 % --   11/23/21 2142 -- -- -- -- -- 97 % --   11/23/21 2130 (!) 143/94 -- -- 102 18 93 % --   11/23/21 2115 (!) 137/90 -- -- 101 18 -- --   11/23/21 2100 -- -- -- -- -- 97 % --   11/23/21 2015 -- -- -- -- -- 92 % --   11/23/21 2000 -- -- -- -- -- 95 % --   11/23/21 1915 -- -- -- -- -- 94 % --   11/23/21 1900 (!) 137/95 -- -- -- -- 95 % --   11/23/21 1849 (!) 136/90 98.6  F (37  C) Tympanic 119 28 -- 90.7 kg (200 lb)           ED RESULTS:   Results for orders placed or performed during the hospital encounter of 11/23/21 (from the past 24 hour(s))   Chest XR,  PA & LAT     Status: None    Collection Time: 11/23/21  7:35 PM    Narrative    EXAM: XR CHEST 2 VW  LOCATION: [facility]  DATE/TIME: 11/23/2021 7:30 PM    INDICATION: \"pneumonia\" , evalaute for pneumothorax. Rib pain.  COMPARISON: 11/22/2021      Impression    IMPRESSION: No significant change. Patchy airspace infiltrates present bilaterally, left worse than right consistent with pneumonia and very consistent with COVID pneumonia. There is no pneumothorax or pleural effusion evident. Heart size normal. No   fractures identified.   Comprehensive metabolic panel     Status: Abnormal    Collection Time: 11/23/21  8:42 PM   Result Value Ref Range    Sodium 134 133 - 144 mmol/L    Potassium 4.5 3.4 - 5.3 mmol/L    Chloride 101 94 - 109 mmol/L    Carbon Dioxide (CO2) 24 20 - 32 mmol/L    Anion Gap 9 3 - 14 mmol/L    Urea Nitrogen 21 7 - 30 mg/dL    Creatinine 0.99 0.66 - 1.25 mg/dL    Calcium 8.9 8.5 - 10.1 mg/dL    Glucose 113 (H) 70 - " 99 mg/dL    Alkaline Phosphatase 92 40 - 150 U/L    AST 59 (H) 0 - 45 U/L    ALT 64 0 - 70 U/L    Protein Total 8.3 6.8 - 8.8 g/dL    Albumin 2.7 (L) 3.4 - 5.0 g/dL    Bilirubin Total 0.9 0.2 - 1.3 mg/dL    GFR Estimate 85 >60 mL/min/1.73m2   Blood gas venous     Status: Abnormal    Collection Time: 11/23/21  8:42 PM   Result Value Ref Range    pH Venous 7.39 7.32 - 7.43    pCO2 Venous 44 40 - 50 mm Hg    pO2 Venous 18 (L) 25 - 47 mm Hg    Bicarbonate Venous 27 21 - 28 mmol/L    Base Excess/Deficit (+/-) 1.1 -7.7 - 1.9 mmol/L    FIO2 2    Lactic acid whole blood     Status: Normal    Collection Time: 11/23/21  8:42 PM   Result Value Ref Range    Lactic Acid 1.9 0.7 - 2.0 mmol/L   Magnesium     Status: Abnormal    Collection Time: 11/23/21  8:42 PM   Result Value Ref Range    Magnesium 2.7 (H) 1.6 - 2.3 mg/dL   Troponin I     Status: Abnormal    Collection Time: 11/23/21  8:42 PM   Result Value Ref Range    Troponin I High Sensitivity 2,695 (HH) <79 ng/L   Phosphorus     Status: Normal    Collection Time: 11/23/21  8:42 PM   Result Value Ref Range    Phosphorus 3.9 2.5 - 4.5 mg/dL   Procalcitonin     Status: Abnormal    Collection Time: 11/23/21  8:42 PM   Result Value Ref Range    Procalcitonin 0.08 (H) <0.05 ng/mL   CRP inflammation     Status: Abnormal    Collection Time: 11/23/21  8:42 PM   Result Value Ref Range    CRP Inflammation 91.2 (H) 0.0 - 8.0 mg/L   Ferritin     Status: Abnormal    Collection Time: 11/23/21  8:42 PM   Result Value Ref Range    Ferritin 954 (H) 26 - 388 ng/mL   Troponin I     Status: Abnormal    Collection Time: 11/23/21  8:42 PM   Result Value Ref Range    Troponin I 2.934 (HH) 0.000 - 0.045 ug/L   Symptomatic COVID-19 Virus (Coronavirus) by PCR Nasopharyngeal     Status: Abnormal    Collection Time: 11/23/21  8:45 PM    Specimen: Nasopharyngeal; Swab   Result Value Ref Range    SARS CoV2 PCR Positive (A) Negative    Narrative    Testing was performed using the chika  SARS-CoV-2 &  Influenza A/B Assay on the chika  Brenda  System.  This test should be ordered for the detection of SARS-COV-2 in individuals who meet SARS-CoV-2 clinical and/or epidemiological criteria. Test performance is unknown in asymptomatic patients.  This test is for in vitro diagnostic use under the FDA EUA for laboratories certified under CLIA to perform moderate and/or high complexity testing. This test has not been FDA cleared or approved.  A negative test does not rule out the presence of PCR inhibitors in the specimen or target RNA in concentration below the limit of detection for the assay. The possibility of a false negative should be considered if the patient's recent exposure or clinical presentation suggests COVID-19.  [facility] are certified under the Clinical Laboratory Improvement Amendments of 1988 (CLIA-88) as qualified to perform moderate and/or high complexity laboratory testing.   CBC with platelets differential     Status: Abnormal    Collection Time: 11/23/21  9:24 PM    Narrative    The following orders were created for panel order CBC with platelets differential.  Procedure                               Abnormality         Status                     ---------                               -----------         ------                     CBC with platelets and d...[[id number]]  Abnormal            Final result                 Please view results for these tests on the individual orders.   CBC with platelets and differential     Status: Abnormal    Collection Time: 11/23/21  9:24 PM   Result Value Ref Range    WBC Count 8.9 4.0 - 11.0 10e3/uL    RBC Count 4.25 (L) 4.40 - 5.90 10e6/uL    Hemoglobin 13.2 (L) 13.3 - 17.7 g/dL    Hematocrit 38.2 (L) 40.0 - 53.0 %    MCV 90 78 - 100 fL    MCH 31.1 26.5 - 33.0 pg    MCHC 34.6 31.5 - 36.5 g/dL    RDW 11.7 10.0 - 15.0 %    Platelet Count 259 150 - 450 10e3/uL    % Neutrophils 88 %    % Lymphocytes 5 %    % Monocytes 6 %    % Eosinophils 1 %    %  Basophils 0 %    % Immature Granulocytes 0 %    NRBCs per 100 WBC 0 <1 /100    Absolute Neutrophils 7.8 1.6 - 8.3 10e3/uL    Absolute Lymphocytes 0.4 (L) 0.8 - 5.3 10e3/uL    Absolute Monocytes 0.5 0.0 - 1.3 10e3/uL    Absolute Eosinophils 0.1 0.0 - 0.7 10e3/uL    Absolute Basophils 0.0 0.0 - 0.2 10e3/uL    Absolute Immature Granulocytes 0.0 <=0.0 10e3/uL    Absolute NRBCs 0.0 10e3/uL   CT Chest Pulmonary Embolism w Contrast     Status: Abnormal    Collection Time: 11/23/21  9:25 PM   Result Value Ref Range    Radiologist flags Pulmonary embolism (AA)     Narrative    EXAM: CT CHEST PULMONARY EMBOLISM W CONTRAST  LOCATION: [facility]  DATE/TIME: 11/23/2021 9:07 PM    INDICATION: Severe left lower chest pain, pneumonia on CXR.  COMPARISON: None.  TECHNIQUE: CT chest pulmonary angiogram during arterial phase injection of IV contrast. Multiplanar reformats and MIP reconstructions were performed. Dose reduction techniques were used.   CONTRAST: 78 mL Isovue 370.    FINDINGS:  ANGIOGRAM CHEST: Fairly extensive bilateral pulmonary emboli including first-order branches of the right and left pulmonary arteries. Thoracic aorta is negative for dissection. There is flattening of the interventricular septum concerning for right heart   strain.    LUNGS AND PLEURA: Moderately extensive infiltrates. No effusions.    MEDIASTINUM/AXILLAE: No adenopathy or aneurysm.    CORONARY ARTERY CALCIFICATION: None.    UPPER ABDOMEN: No acute findings.    MUSCULOSKELETAL: No frankly destructive bony lesions.      Impression    IMPRESSION:  1.  Positive study for pulmonary emboli, large clot burden.  2.  Moderately extensive infiltrates.      [Critical Result: Pulmonary embolism]    Finding was identified on 11/23/2021 9:31 PM.     Dr. Leal was contacted by me on 11/23/2021 9:36 PM and verbalized understanding of the critical result.    Partial thromboplastin time     Status: Normal    Collection Time: 11/23/21  10:26 PM   Result Value Ref Range    aPTT 33 22 - 38 Seconds   Lactic acid whole blood     Status: Normal    Collection Time: 11/23/21 10:26 PM   Result Value Ref Range    Lactic Acid 1.2 0.7 - 2.0 mmol/L   Blood Culture Hand, Left     Status: Normal (Preliminary result)    Collection Time: 11/23/21 10:26 PM    Specimen: Hand, Left; Blood   Result Value Ref Range    Culture No growth after 12 hours    Lactate Dehydrogenase     Status: Abnormal    Collection Time: 11/23/21 10:26 PM   Result Value Ref Range    Lactate Dehydrogenase 344 (H) 85 - 227 U/L   INR     Status: Abnormal    Collection Time: 11/23/21 10:26 PM   Result Value Ref Range    INR 1.41 (H) 0.85 - 1.15   [place] Draw     Status: None    Collection Time: 11/23/21 10:26 PM    Narrative    The following orders were created for panel order [place] Draw.  Procedure                               Abnormality         Status                     ---------                               -----------         ------                     Extra Red Top Tube[[id number]]                               Final result               Extra Serum Separator Tu...[[id number]]                      Final result               Extra Purple Top Tube[[id number]]                            Final result                 Please view results for these tests on the individual orders.   Extra Red Top Tube     Status: None    Collection Time: 11/23/21 10:26 PM   Result Value Ref Range    Hold Specimen JIC    Extra Serum Separator Tube (SST)     Status: None    Collection Time: 11/23/21 10:26 PM   Result Value Ref Range    Hold Specimen JIC    Extra Purple Top Tube     Status: None    Collection Time: 11/23/21 10:26 PM   Result Value Ref Range    Hold Specimen JIC    D dimer quantitative     Status: Abnormal    Collection Time: 11/23/21 10:26 PM   Result Value Ref Range    D-Dimer Quantitative >20.00 (HH) 0.00 - 0.50 ug/mL FEU    Narrative    This D-dimer assay is intended for use in conjunction  with a clinical pretest probability assessment model to exclude pulmonary embolism (PE) and deep venous thrombosis (DVT) in outpatients suspected of PE or DVT. The cut-off value is 0.50 ug/mL FEU.   Blood Culture Hand, Right     Status: Normal (Preliminary result)    Collection Time: 11/24/21 12:25 AM    Specimen: Hand, Right; Blood   Result Value Ref Range    Culture No growth after 12 hours    Heparin Unfractionated Anti Xa Level     Status: Normal    Collection Time: 11/24/21  4:31 AM   Result Value Ref Range    Anti Xa Unfractionated Heparin 0.81 For Reference Range, See Comment IU/mL    Narrative    Therapeutic Range: UFH: 0.25-0.50 IU/mL for low intensity dosing,  0.30-0.70 IU/mL for high intensity dosing DVT and PE.  This test is not validated for other direct factor X inhibitors (e.g. rivaroxaban, apixaban, edoxaban, betrixaban, fondaparinux) and should not be used for monitoring of other medications.   [place] Draw     Status: None    Collection Time: 11/24/21  4:31 AM    Narrative    The following orders were created for panel order [place] Draw.  Procedure                               Abnormality         Status                     ---------                               -----------         ------                     Extra Green Top (Lithium...[[id number]]                      Final result               Extra Purple Top Tube[[id number]]                            Final result                 Please view results for these tests on the individual orders.   Extra Green Top (Lithium Heparin) Tube     Status: None    Collection Time: 11/24/21  4:31 AM   Result Value Ref Range    Hold Specimen JIC    Extra Purple Top Tube     Status: None    Collection Time: 11/24/21  4:31 AM   Result Value Ref Range    Hold Specimen JIC    Troponin I     Status: Abnormal    Collection Time: 11/24/21  4:31 AM   Result Value Ref Range    Troponin I High Sensitivity 1,278 (HH) <79 ng/L   Troponin I     Status: Abnormal     Collection Time: 21  4:31 AM   Result Value Ref Range    Troponin I 1.454 (HH) 0.000 - 0.045 ug/L   NT pro BNP     Status: Abnormal    Collection Time: 21  4:31 AM   Result Value Ref Range    N terminal Pro BNP Inpatient 1,438 (H) 0 - 900 pg/mL   Echocardiogram Complete     Status: None    Collection Time: 21 11:18 AM   Result Value Ref Range    LVEF  65-70%     Narrative    [id number]  [id number]  [id number]  [id number]^PEYTON^NAOMI^L     [facility]  Echocardiography Laboratory  [address] [place] [address]     Name: JUSTINA TAYLOR  MRN: [id number]  : 1965  Study Date: 2021 10:44 AM  Age: 56 yrs  Gender: Male  Patient Location: [facility]  Reason For Study: Pulmonary Embolism  Ordering Physician: NAOMI TODD  Referring Physician: [facility]  Performed By: Deandra Stephens RDCS     BSA: 0.30 m2  Height: 72 in  Weight: 2 lb  HR: 76  BP: 140/96 mmHg  ______________________________________________________________________________  Procedure  Complete Portable Echo Adult. Optison (NDC #0537-2019) given intravenously.  Complete Echo Adult.  ______________________________________________________________________________  Interpretation Summary     Right ventricle and right atrium not clearly seen. RV appears dilated based on  limited views.  Right ventricular systolic pressure is elevated, consistent with mild to  moderate pulmonary hypertension.  The visual ejection fraction is 65-70%.  The study was technically difficult. Contrast was used without apparent  complications.  ______________________________________________________________________________  Left Ventricle  The left ventricle is normal in size. There is normal left ventricular wall  thickness. The visual ejection fraction is 65-70%. Left ventricular diastolic  function is not assessable. No regional wall motion abnormalities noted.     Right Ventricle  The right ventricle is normal in structure,  function and size.     Atria  Normal left atrial size. Right atrium not well visualized. There is no color  Doppler evidence of an atrial shunt.     Mitral Valve  The mitral valve is normal in structure and function.     Tricuspid Valve  The tricuspid valve is not well visualized. Right ventricular systolic  pressure is elevated, consistent with mild to moderate pulmonary hypertension.     Aortic Valve  The aortic valve is normal in structure and function.     Pulmonic Valve  The pulmonic valve is not well visualized.     Vessels  Normal size aorta. The ascending aorta is Mildly dilated. The inferior vena  cava is normal.     Pericardium  The pericardium appears normal.     Rhythm  Sinus rhythm was noted.  ______________________________________________________________________________  MMode/2D Measurements & Calculations  IVSd: 1.1 cm     LVIDd: 4.7 cm  LVIDs: 2.9 cm  LVPWd: 1.2 cm  FS: 38.1 %  LV mass(C)d: 194.2 grams  LV mass(C)dI: 645.3 grams/m2  Ao root diam: 3.4 cm  LA dimension: 3.0 cm  asc Aorta Diam: 4.0 cm  LA/Ao: 0.88  LA Volume (BP): 46.0 ml  LA Volume Index (BP): 21.6 ml/m2  RWT: 0.50     Doppler Measurements & Calculations  MV E max alan: 48.5 cm/sec  MV A max alan: 62.2 cm/sec  MV E/A: 0.78  MV dec time: 0.21 sec  PA acc time: 0.07 sec  TR max alan: 262.4 cm/sec  TR max P.5 mmHg  E/E' av.6  Lateral E/e': 5.3  Medial E/e': 6.0     ______________________________________________________________________________  Report approved by: Brigitte Givens 2021 11:55 AM             ED MEDICATIONS:   Medications   remdesivir 100 mg in sodium chloride 0.9 % 250 mL intermittent infusion (has no administration in time range)     And   0.9% sodium chloride BOLUS (has no administration in time range)   dexamethasone PF (DECADRON) injection 10 mg (10 mg Intravenous Given 21 7972)   heparin 25,000 units in 0.45% NaCl 250 mL ANTICOAGULANT infusion (1,550 Units/hr Intravenous New Bag 21 1141)   sodium  chloride (PF) 0.9% PF flush 3 mL (has no administration in time range)   sodium chloride (PF) 0.9% PF flush 3 mL (has no administration in time range)   lactated ringers BOLUS 1,000 mL (0 mLs Intravenous Stopped 11/23/21 2221)   droperidol (INAPSINE) injection 1.25 mg (1.25 mg Intravenous Given 11/23/21 2052)   HYDROmorphone (DILAUDID) injection 1 mg (1 mg Intravenous Given 11/23/21 2054)   iopamidol (ISOVUE-370) solution 78 mL (78 mLs Intravenous Given 11/23/21 2108)   sodium chloride 0.9 % bag 500mL for CT scan flush use (100 mLs Intravenous Given 11/23/21 2108)   remdesivir 200 mg in sodium chloride 0.9 % 250 mL intermittent infusion (0 mg Intravenous Stopped 11/23/21 2328)     Followed by   0.9% sodium chloride BOLUS (0 mLs Intravenous Stopped 11/23/21 2347)   heparin ANTICOAGULANT Loading dose for HIGH INTENSITY TREATMENT * Give BEFORE starting heparin infusion (7,250 Units Intravenous Given 11/23/21 2229)   perflutren diluted 1mL to 2mL with saline (OPTISON) diluted injection 2 mL (2 mLs Intravenous Given 11/24/21 1119)   sodium chloride (PF) 0.9% PF flush 10 mL (10 mLs Intracatheter Given 11/24/21 1118)         Impression:    ICD-10-CM    1. Pneumonia due to 2019 novel coronavirus  U07.1     J12.82    2. Acute respiratory failure with hypoxia (H)  J96.01    3. Acute pulmonary embolism with acute cor pulmonale, unspecified pulmonary embolism type (H)  I26.09     Bilateral segmental and subsegmental pulmonary emboli with right-sided heart strain   4. Multiple subsegmental pulmonary emboli without acute cor pulmonale (H)  I26.94        Plan:    Pending studies include: Echo.      I received a call from the physician's assistant with interventional radiology.  Plan to anticoagulate with heparin pending echocardiogram.  No plans for thrombectomy at this time.    Echocardiogram with right ventricle right atrium not clearly seen.  RV appears dilated based on limited views.  Revenge of systolic pressure elevated  consistent with mild to moderate pulmonary hypertension.  Visual ejection fraction of 65 to 70%.    A bed finally became available at [facility] and plan to admit patient to the floor.  He is requiring 4 L of submental oxygen by nasal cannula.  He is tachycardic with a heart rate in the 120s.  He is able to speak in full sentences.  Patient received remdesivir and dexamethasone.  Case discussed with Dr. Braydon Lunsford with hospitalist service who is of the admission.  Transition orders were placed.  Answered patient's questions to the best my ability.  Patient was complaining of pain in his right calf and I feel this is the most likely source of his PE.  No prior history of DVT.  Patient is on day 8 of symptoms so difficult to know if he is at the peak of his oxygen requirement related to SARS-CoV-2.      MD Jonn Zhou Richard J, MD  11/24/21 2331

## 2021-11-24 NOTE — ED PROVIDER NOTES
"  History     Chief Complaint   Patient presents with     Rib Pain     cough- known pneumonia. Worsening SOA after coughing and \"pulling a rib\"     HPI  Rafita Herrera is a 56 year old male not been vaccinated versus COVID-19 with 2 weeks of URI symptoms and 1 week of worsened cough and shortness of breath who presents Emergency Department with severe left anterolateral chest pain which began suddenly after he awoke at approximately 5:00 AM when was coughing.  Sudden, sharp, severe pain which is exacerbated by movement, change in position and deep inspiration.  He thinks he 'pulled a rib'.  He has nonproductive cough, shortness of breath, fatigue and malaise, but no hemoptysis or leg pain or leg swelling.  He was seen in clinic yesterday and reports had a chest x-ray showing patchy bilateral mid and lower lung infiltrates.  He declined a COVID-19 study and apparently had one done in the week prior which he reports was negative.  The studies were performed in an outside facility and I cannot find report of these.  He was started on Doxycycline and Augmentin. No significant recent travel or known infectious exposures.  No other signs or symptoms of COVID-19 illness.    Allergies:  No Known Allergies    Problem List:    Patient Active Problem List    Diagnosis Date Noted     Acute respiratory failure with hypoxia (H) 11/23/2021     Priority: Medium     Acute pulmonary embolism with acute cor pulmonale, unspecified pulmonary embolism type (H) 11/23/2021     Priority: Medium     Pneumonia due to 2019 novel coronavirus 11/23/2021     Priority: Medium     Hyperlipidemia with target LDL less than 130 01/22/2014     Priority: Medium     Diagnosis updated by automated process. Provider to review and confirm.       Hypertension 10/14/2013     Priority: Medium     Atenolol caused bradycardia at 50 mg daily. Hytrin likely caused side effects.         Past Medical History:    Past Medical History:   Diagnosis Date     Acute upper " respiratory infections of unspecified site      Lumbago      Vitiligo        Past Surgical History:    No past surgical history on file.    Family History:    Family History   Problem Relation Age of Onset     Lipids Father      Allergies Father         HAYFEVER     Heart Disease Father         bypass and stents starting at age 50.     Allergies Sister         CATS     Alzheimer Disease Other         POSSIBLY AUNT ON FATHERS SIDE     Allergies Sister         HAYFEVER     Hypertension Mother         Taking generic Lopressor.       Social History:  Marital Status:   [2]  Social History     Tobacco Use     Smoking status: Never Smoker     Smokeless tobacco: Never Used   Substance Use Topics     Alcohol use: Yes     Comment: socially- less than 7 drinks per week.     Drug use: No        Medications:    amoxicillin-clavulanate (AUGMENTIN) 875-125 MG tablet  doxycycline hyclate (VIBRA-TABS) 100 MG tablet  IBUPROFEN 200 200 MG OR TABS      Review of Systems  As mentioned above in the history present illness.  All other systems were reviewed and are negative.    Physical Exam   BP: (!) 136/90  Pulse: 119  Temp: 98.6  F (37  C)  Resp: 28  Weight: 90.7 kg (200 lb)  SpO2: 95 %      Physical Exam  Vitals and nursing note reviewed.   Constitutional:       General: He is in acute distress ( Very anxious secondary to chest pain).      Appearance: Normal appearance. He is well-developed. He is ill-appearing. He is not diaphoretic.   HENT:      Head: Normocephalic and atraumatic.      Right Ear: External ear normal.      Left Ear: External ear normal.      Mouth/Throat:      Mouth: Mucous membranes are dry.   Eyes:      General: No scleral icterus.     Extraocular Movements: Extraocular movements intact.      Conjunctiva/sclera: Conjunctivae normal.   Neck:      Trachea: No tracheal deviation.   Cardiovascular:      Rate and Rhythm: Normal rate and regular rhythm.      Heart sounds: Normal heart sounds. No murmur heard.  No  "friction rub. No gallop.    Pulmonary:      Effort: Pulmonary effort is normal. No respiratory distress.      Breath sounds: Stridor present. Rales ( Bibasilar) present. No wheezing or rhonchi.   Chest:      Chest wall: Tenderness present.   Abdominal:      General: There is no distension.      Palpations: Abdomen is soft.      Tenderness: There is no abdominal tenderness.   Musculoskeletal:         General: No swelling or tenderness. Normal range of motion.      Cervical back: Normal range of motion and neck supple.      Right lower leg: No edema.      Left lower leg: No edema.   Skin:     General: Skin is warm and dry.      Coloration: Skin is not pale.      Findings: No erythema or rash.   Neurological:      General: No focal deficit present.      Mental Status: He is alert and oriented to person, place, and time.   Psychiatric:         Mood and Affect: Mood normal.         Behavior: Behavior normal.         ED Course        Procedures              Results for orders placed or performed during the hospital encounter of 11/23/21 (from the past 24 hour(s))   Chest XR,  PA & LAT    Narrative    EXAM: XR CHEST 2 VW  LOCATION: [facility]  DATE/TIME: 11/23/2021 7:30 PM    INDICATION: \"pneumonia\" , evalaute for pneumothorax. Rib pain.  COMPARISON: 11/22/2021      Impression    IMPRESSION: No significant change. Patchy airspace infiltrates present bilaterally, left worse than right consistent with pneumonia and very consistent with COVID pneumonia. There is no pneumothorax or pleural effusion evident. Heart size normal. No   fractures identified.   Comprehensive metabolic panel   Result Value Ref Range    Sodium 134 133 - 144 mmol/L    Potassium 4.5 3.4 - 5.3 mmol/L    Chloride 101 94 - 109 mmol/L    Carbon Dioxide (CO2) 24 20 - 32 mmol/L    Anion Gap 9 3 - 14 mmol/L    Urea Nitrogen 21 7 - 30 mg/dL    Creatinine 0.99 0.66 - 1.25 mg/dL    Calcium 8.9 8.5 - 10.1 mg/dL    Glucose 113 (H) 70 - 99 " mg/dL    Alkaline Phosphatase 92 40 - 150 U/L    AST 59 (H) 0 - 45 U/L    ALT 64 0 - 70 U/L    Protein Total 8.3 6.8 - 8.8 g/dL    Albumin 2.7 (L) 3.4 - 5.0 g/dL    Bilirubin Total 0.9 0.2 - 1.3 mg/dL    GFR Estimate 85 >60 mL/min/1.73m2   Blood gas venous   Result Value Ref Range    pH Venous 7.39 7.32 - 7.43    pCO2 Venous 44 40 - 50 mm Hg    pO2 Venous 18 (L) 25 - 47 mm Hg    Bicarbonate Venous 27 21 - 28 mmol/L    Base Excess/Deficit (+/-) 1.1 -7.7 - 1.9 mmol/L    FIO2 2    Lactic acid whole blood   Result Value Ref Range    Lactic Acid 1.9 0.7 - 2.0 mmol/L   Magnesium   Result Value Ref Range    Magnesium 2.7 (H) 1.6 - 2.3 mg/dL   Troponin I   Result Value Ref Range    Troponin I S 2,695 (HH) <79 ng/L   Phosphorus   Result Value Ref Range    Phosphorus 3.9 2.5 - 4.5 mg/dL   CRP inflammation   Result Value Ref Range    CRP Inflammation 91.2 (H) 0.0 - 8.0 mg/L   Ferritin   Result Value Ref Range    Ferritin 954 (H) 26 - 388 ng/mL   Troponin I   Result Value Ref Range    Troponin I 2.934 (HH) 0.000 - 0.045 ug/L   Symptomatic COVID-19 Virus (Coronavirus) by PCR Nasopharyngeal    Specimen: Nasopharyngeal; Swab   Result Value Ref Range    SARS CoV2 PCR Positive (A) Negative    Narrative    Testing was performed using the chika  SARS-CoV-2 & Influenza A/B Assay on the chika  Brenda  System.  This test should be ordered for the detection of SARS-COV-2 in individuals who meet SARS-CoV-2 clinical and/or epidemiological criteria. Test performance is unknown in asymptomatic patients.  This test is for in vitro diagnostic use under the FDA EUA for laboratories certified under CLIA to perform moderate and/or high complexity testing. This test has not been FDA cleared or approved.  A negative test does not rule out the presence of PCR inhibitors in the specimen or target RNA in concentration below the limit of detection for the assay. The possibility of a false negative should be considered if the patient's recent exposure or  clinical presentation suggests COVID-19.  [facility] are certified under the Clinical Laboratory Improvement Amendments of 1988 (CLIA-88) as qualified to perform moderate and/or high complexity laboratory testing.   CBC with platelets differential    Narrative    The following orders were created for panel order CBC with platelets differential.  Procedure                               Abnormality         Status                     ---------                               -----------         ------                     CBC with platelets and d...[[id number]]  Abnormal            Final result                 Please view results for these tests on the individual orders.   CBC with platelets and differential   Result Value Ref Range    WBC Count 8.9 4.0 - 11.0 10e3/uL    RBC Count 4.25 (L) 4.40 - 5.90 10e6/uL    Hemoglobin 13.2 (L) 13.3 - 17.7 g/dL    Hematocrit 38.2 (L) 40.0 - 53.0 %    MCV 90 78 - 100 fL    MCH 31.1 26.5 - 33.0 pg    MCHC 34.6 31.5 - 36.5 g/dL    RDW 11.7 10.0 - 15.0 %    Platelet Count 259 150 - 450 10e3/uL    % Neutrophils 88 %    % Lymphocytes 5 %    % Monocytes 6 %    % Eosinophils 1 %    % Basophils 0 %    % Immature Granulocytes 0 %    NRBCs per 100 WBC 0 <1 /100    Absolute Neutrophils 7.8 1.6 - 8.3 10e3/uL    Absolute Lymphocytes 0.4 (L) 0.8 - 5.3 10e3/uL    Absolute Monocytes 0.5 0.0 - 1.3 10e3/uL    Absolute Eosinophils 0.1 0.0 - 0.7 10e3/uL    Absolute Basophils 0.0 0.0 - 0.2 10e3/uL    Absolute Immature Granulocytes 0.0 <=0.0 10e3/uL    Absolute NRBCs 0.0 10e3/uL   CT Chest Pulmonary Embolism w Contrast   Result Value Ref Range    Radiologist flags Pulmonary embolism (AA)     Narrative    EXAM: CT CHEST PULMONARY EMBOLISM W CONTRAST  LOCATION: [facility]  DATE/TIME: 11/23/2021 9:07 PM    INDICATION: Severe left lower chest pain, pneumonia on CXR.  COMPARISON: None.  TECHNIQUE: CT chest pulmonary angiogram during arterial phase injection of IV  contrast. Multiplanar reformats and MIP reconstructions were performed. Dose reduction techniques were used.   CONTRAST: 78 mL Isovue 370.    FINDINGS:  ANGIOGRAM CHEST: Fairly extensive bilateral pulmonary emboli including first-order branches of the right and left pulmonary arteries. Thoracic aorta is negative for dissection. There is flattening of the interventricular septum concerning for right heart   strain.    LUNGS AND PLEURA: Moderately extensive infiltrates. No effusions.    MEDIASTINUM/AXILLAE: No adenopathy or aneurysm.    CORONARY ARTERY CALCIFICATION: None.    UPPER ABDOMEN: No acute findings.    MUSCULOSKELETAL: No frankly destructive bony lesions.      Impression    IMPRESSION:  1.  Positive study for pulmonary emboli, large clot burden.  2.  Moderately extensive infiltrates.      [Critical Result: Pulmonary embolism]    Finding was identified on 11/23/2021 9:31 PM.     Dr. Leal was contacted by me on 11/23/2021 9:36 PM and verbalized understanding of the critical result.    Partial thromboplastin time   Result Value Ref Range    aPTT 33 22 - 38 Seconds   Lactic acid whole blood   Result Value Ref Range    Lactic Acid 1.2 0.7 - 2.0 mmol/L   Lactate Dehydrogenase   Result Value Ref Range    Lactate Dehydrogenase 344 (H) 85 - 227 U/L   INR   Result Value Ref Range    INR 1.41 (H) 0.85 - 1.15   [facility]    Narrative    The following orders were created for panel order [facility].  Procedure                               Abnormality         Status                     ---------                               -----------         ------                     Extra Red Top Tube[[id number]]                               Final result               Extra Serum Separator Tu...[[id number]]                      Final result               Extra Purple Top Tube[[id number]]                            Final result                 Please view results for these tests on the individual orders.   Extra Red Top Tube    Result Value Ref Range    Hold Specimen JIC    Extra Serum Separator Tube (SST)   Result Value Ref Range    Hold Specimen JIC    Extra Purple Top Tube   Result Value Ref Range    Hold Specimen JIC        Medications   remdesivir 100 mg in sodium chloride 0.9 % 250 mL intermittent infusion (has no administration in time range)     And   0.9% sodium chloride BOLUS (has no administration in time range)   dexamethasone PF (DECADRON) injection 10 mg (10 mg Intravenous Given 11/23/21 2329)   heparin 25,000 units in 0.45% NaCl 250 mL ANTICOAGULANT infusion (1,650 Units/hr Intravenous New Bag 11/23/21 2242)   sodium chloride (PF) 0.9% PF flush 3 mL (has no administration in time range)   sodium chloride (PF) 0.9% PF flush 3 mL (has no administration in time range)   lactated ringers BOLUS 1,000 mL (0 mLs Intravenous Stopped 11/23/21 2221)   droperidol (INAPSINE) injection 1.25 mg (1.25 mg Intravenous Given 11/23/21 2052)   HYDROmorphone (DILAUDID) injection 1 mg (1 mg Intravenous Given 11/23/21 2054)   iopamidol (ISOVUE-370) solution 78 mL (78 mLs Intravenous Given 11/23/21 2108)   sodium chloride 0.9 % bag 500mL for CT scan flush use (100 mLs Intravenous Given 11/23/21 2108)   remdesivir 200 mg in sodium chloride 0.9 % 250 mL intermittent infusion (0 mg Intravenous Stopped 11/23/21 2328)     Followed by   0.9% sodium chloride BOLUS (0 mLs Intravenous Stopped 11/23/21 2347)   heparin ANTICOAGULANT Loading dose for HIGH INTENSITY TREATMENT * Give BEFORE starting heparin infusion (7,250 Units Intravenous Given 11/23/21 2229)     While splinting due to severe left-sided chest pain he intermittently becomes hypoxic with O2 saturations in the mid to low 80s. This resolves when encouraged to take deep breaths.    9:35 PM - I was contacted by the Radiologist who interpreted his CT chest PE protocol and reports significant pulmonary embolism burden with evidence of right heart strain. He also has significant bilateral COVID-19  infiltrates. I will contact the PERT team at [facility] for guidance regarding his treatment for submassive PE in the setting of COVID-19 pneumonia with acute respiratory failure with hypoxia without hemodynamic compromise.    9:52 PM - Reviewed case with the PERT team at [facility], Dr. Vargas of Cardiology and Dr. Agustin of Interventional Radiology.  They have no hospital capacity to accept transfer and have not been recently been able to perform interventional procedures/thrombectomy for submassive PEs.  They recommend Heparizination, no IR intervention for submassive PE, follow troponins and obtain an Echo in the morning.  Admit or transfer as soon as possible, since a bed becomes available.    10:14 PM - Initial hypoxia treated with 2 L/nasal cannula and I had RT assess the patient in the ED and he was placed on high flow nasal cannula, 60% FiO2, 45 L.  He is satting at 98% on room air and will titrate his oxygen support down to 50% FiO2, 40 L.    Patient reports he does not like the high flow nasal cannula and that it makes him nauseous.  He refuses HFNC and will be placed on nasal cannula.    O2 saturation 97% on room air on 6 L/nasal cannula.    10:43 PM - Care of the patient signed out to Dr. Mohan at the end of my shift.    Assessments & Plan (with Medical Decision Making)   56-year-old male not previously vaccinated versus COVID-19 with 2 weeks of COVID-19 illness, worse in the past week, with sudden sharp severe left-sided chest pain and chest wall pain at approximately 5 AM this morning while coughing.  He was seen in clinic yesterday and had a chest x-ray suggestive of COVID-19 pneumonia, but declined Covid testing because he had been recently tested and tested negative.  He was prescribed Doxycycline and Augmentin, which he has taken since yesterday with no improvement.  He appeared very uncomfortable and anxious secondary to chest pain and was given IV Dilaudid for pain  control.  Has significant hypoxia secondary to severe chest pain and splinting with O2 saturations in the low 80s to low 90s.  Chest x-ray showed no pneumothorax, rib fracture and bilateral patchy infiltrates consistent with COVID-19 illness.  COVID-19 nasopharyngeal study was positive.  A CT scan of the chest PE protocol was performed and showed bilateral infiltrates consistent with COVID-19 pneumonia, and bilateral pulmonary emboli with significant clot burden and evidence of right-sided strain consistent with submassive PE.  I consulted the PERT team at [facility] and they have no hospital capacity to care for him there or perform interventional thrombectomy for submassive PE.  They recommended heparinization and admission there or here as soon as a bed becomes available, with monitoring of troponins and an Echo in the a.m.  The respiratory therapist was consulted and saw the patient in the ED and initiated high flow nasal cannula therapy which he did not like, stating that it caused him nausea.  He refused high flow nasal cannula and was placed on O2/nasal cannula and satting at 97% room air on 6 L/nasal cannula. He was given dexamethasone, remdesivir and heparinized.  His care was signed out to Dr. Mohan at the end of my shift with plan to await bed availability for his admission.    I have reviewed the nursing notes.    I have reviewed the findings, diagnosis, plan and need for follow up with the patient.    New Prescriptions    No medications on file       Final diagnoses:   Pneumonia due to 2019 novel coronavirus   Acute respiratory failure with hypoxia (H)   Acute pulmonary embolism with acute cor pulmonale, unspecified pulmonary embolism type (H) - Bilateral segmental and subsegmental pulmonary emboli with right-sided heart strain       11/23/2021   [facility] EMERGENCY DEPT     Josh Leal MD  11/24/21 0102

## 2021-11-24 NOTE — PROGRESS NOTES
Interventional Radiology Consult Service Note  11/24/21     Case discussed with IR attending Dr. Zack Agustin.     This is a 56 year old currently admitted at [facility] with COVID infection and CT 11/23/2021 demonstrating large clot burden PE seen bilaterally, elevated troponin.  We received the call last night and the PERT was responded to last night.     This morning still pending ECHO planned for 10:45A when I spoke with ED attending.      In order for IR intervention, would need accepting team from ICU as well as bed placement.  Team will contact us if able to secure a transfer.        Labs Reviewed:  Lab Results   Component Value Date    TROPONIN 1.454 () 11/24/2021    TROPONIN 2.934 () 11/23/2021       COVID-19 Antibody Results, Testing for Immunity    COVID-19 Antibody Results, Testing for Immunity   No data to display.         COVID-19 PCR Results    COVID-19 PCR Results 11/23/21   SARS CoV2 PCR Positive (A)   (A) Abnormal value       Comments are available for some flowsheets but are not being displayed.             Jennifer Allison PA-C  Interventional Radiology  Pager: [phone]

## 2021-11-25 PROBLEM — I26.99 ACUTE PULMONARY EMBOLISM WITHOUT ACUTE COR PULMONALE (H): Status: ACTIVE | Noted: 2021-11-23

## 2021-11-25 LAB
ANION GAP SERPL CALCULATED.3IONS-SCNC: 7 MMOL/L (ref 3–14)
BUN SERPL-MCNC: 25 MG/DL (ref 7–30)
CALCIUM SERPL-MCNC: 8.6 MG/DL (ref 8.5–10.1)
CHLORIDE BLD-SCNC: 105 MMOL/L (ref 94–109)
CO2 SERPL-SCNC: 26 MMOL/L (ref 20–32)
CREAT SERPL-MCNC: 1.08 MG/DL (ref 0.66–1.25)
CRP SERPL-MCNC: 73.7 MG/L (ref 0–8)
D DIMER PPP FEU-MCNC: >20 UG/ML FEU (ref 0–0.5)
ENTEROCOCCUS FAECALIS: NOT DETECTED
ENTEROCOCCUS FAECIUM: NOT DETECTED
ERYTHROCYTE [DISTWIDTH] IN BLOOD BY AUTOMATED COUNT: 11.7 % (ref 10–15)
FIBRINOGEN PPP-MCNC: 506 MG/DL (ref 170–490)
GFR SERPL CREATININE-BSD FRML MDRD: 76 ML/MIN/1.73M2
GLUCOSE BLD-MCNC: 169 MG/DL (ref 70–99)
HCT VFR BLD AUTO: 41.8 % (ref 40–53)
HGB BLD-MCNC: 14.3 G/DL (ref 13.3–17.7)
LISTERIA SPECIES (DETECTED/NOT DETECTED): NOT DETECTED
MCH RBC QN AUTO: 30.9 PG (ref 26.5–33)
MCHC RBC AUTO-ENTMCNC: 34.2 G/DL (ref 31.5–36.5)
MCV RBC AUTO: 90 FL (ref 78–100)
PLATELET # BLD AUTO: 346 10E3/UL (ref 150–450)
POTASSIUM BLD-SCNC: 4.9 MMOL/L (ref 3.4–5.3)
RBC # BLD AUTO: 4.63 10E6/UL (ref 4.4–5.9)
SODIUM SERPL-SCNC: 138 MMOL/L (ref 133–144)
STAPHYLOCOCCUS AUREUS: NOT DETECTED
STAPHYLOCOCCUS EPIDERMIDIS: NOT DETECTED
STAPHYLOCOCCUS LUGDUNENSIS: NOT DETECTED
STAPHYLOCOCCUS SPECIES: NOT DETECTED
STREPTOCOCCUS AGALACTIAE: NOT DETECTED
STREPTOCOCCUS ANGINOSUS GROUP: NOT DETECTED
STREPTOCOCCUS PNEUMONIAE: NOT DETECTED
STREPTOCOCCUS PYOGENES: NOT DETECTED
STREPTOCOCCUS SPECIES: NOT DETECTED
TROPONIN I SERPL HS-MCNC: 577 NG/L
TROPONIN I SERPL-MCNC: 0.64 UG/L (ref 0–0.04)
UFH PPP CHRO-ACNC: 0.42 IU/ML
UFH PPP CHRO-ACNC: 0.52 IU/ML
WBC # BLD AUTO: 13 10E3/UL (ref 4–11)

## 2021-11-25 PROCEDURE — 86140 C-REACTIVE PROTEIN: CPT | Performed by: HOSPITALIST

## 2021-11-25 PROCEDURE — 85384 FIBRINOGEN ACTIVITY: CPT | Performed by: HOSPITALIST

## 2021-11-25 PROCEDURE — 36415 COLL VENOUS BLD VENIPUNCTURE: CPT | Performed by: HOSPITALIST

## 2021-11-25 PROCEDURE — 99207 PR APP CREDIT; MD BILLING SHARED VISIT: CPT | Performed by: PHYSICIAN ASSISTANT

## 2021-11-25 PROCEDURE — 80048 BASIC METABOLIC PNL TOTAL CA: CPT | Performed by: HOSPITALIST

## 2021-11-25 PROCEDURE — 99233 SBSQ HOSP IP/OBS HIGH 50: CPT | Performed by: INTERNAL MEDICINE

## 2021-11-25 PROCEDURE — 258N000003 HC RX IP 258 OP 636: Performed by: EMERGENCY MEDICINE

## 2021-11-25 PROCEDURE — 85520 HEPARIN ASSAY: CPT | Performed by: HOSPITALIST

## 2021-11-25 PROCEDURE — 85379 FIBRIN DEGRADATION QUANT: CPT | Performed by: HOSPITALIST

## 2021-11-25 PROCEDURE — 120N000001 HC R&B MED SURG/OB

## 2021-11-25 PROCEDURE — 250N000011 HC RX IP 250 OP 636: Performed by: EMERGENCY MEDICINE

## 2021-11-25 PROCEDURE — 250N000009 HC RX 250: Performed by: EMERGENCY MEDICINE

## 2021-11-25 PROCEDURE — 84484 ASSAY OF TROPONIN QUANT: CPT | Performed by: HOSPITALIST

## 2021-11-25 PROCEDURE — 250N000011 HC RX IP 250 OP 636: Performed by: HOSPITALIST

## 2021-11-25 PROCEDURE — 85027 COMPLETE CBC AUTOMATED: CPT | Performed by: HOSPITALIST

## 2021-11-25 PROCEDURE — 250N000013 HC RX MED GY IP 250 OP 250 PS 637: Performed by: INTERNAL MEDICINE

## 2021-11-25 RX ADMIN — APIXABAN 10 MG: 5 TABLET, FILM COATED ORAL at 20:11

## 2021-11-25 RX ADMIN — HEPARIN SODIUM 1550 UNITS/HR: 10000 INJECTION, SOLUTION INTRAVENOUS at 04:11

## 2021-11-25 RX ADMIN — DEXAMETHASONE SODIUM PHOSPHATE 10 MG: 10 INJECTION INTRAMUSCULAR; INTRAVENOUS at 21:36

## 2021-11-25 RX ADMIN — REMDESIVIR 100 MG: 100 INJECTION, POWDER, LYOPHILIZED, FOR SOLUTION INTRAVENOUS at 20:11

## 2021-11-25 RX ADMIN — SODIUM CHLORIDE 50 ML: 9 INJECTION, SOLUTION INTRAVENOUS at 21:35

## 2021-11-25 ASSESSMENT — ACTIVITIES OF DAILY LIVING (ADL)
ADLS_ACUITY_SCORE: 5

## 2021-11-25 NOTE — PROGRESS NOTES
DATE:  11/25/2021   TIME OF RECEIPT FROM LAB:  2874  LAB TEST:  blood culture  LAB VALUE:  Gram positive cocci in clusters  RESULTS GIVEN : page to Dr. De Jesus, , primary RN notified    TIME LAB VALUE REPORTED TO PROVIDER:   3167

## 2021-11-25 NOTE — PROGRESS NOTES
Called lab, second time, to get Anti 10a level that was drawn around 10 am. They said it would be ready in ten minutes. Ritchie SU called and updated.

## 2021-11-25 NOTE — H&P
"[facility]    History and Physical  Hospital Medicine       Date of Admission:  11/23/2021  Date of Service: 11/24/2021     Assessment & Plan   Rafita Herrera is a 56 year old male who presents on 11/23/2021 with COVID-19 and pulmonary embolism    Active Problems:    Acute respiratory failure with hypoxia (H)    Acute pulmonary embolism with acute cor pulmonale, unspecified pulmonary embolism type (H)    Pneumonia due to 2019 novel coronavirus    Multiple subsegmental pulmonary emboli without acute cor pulmonale (H)    # Acute PE  CT PE 11/23 9 pm:  \"Fairly extensive bilateral pulmonary emboli including first-order branches of the right and left pulmonary arteries.\" There was consideration of transferring for IR thrombectomy but there were not any beds available for postprocedural care and so he was kept here in the hospital.   Echocardiogram earlier today with RV dilation (although not well visualized) and e/o moderate pulmonary hypertension. Troponin I initally 2.934 ? 1.454. BP WNL. Continues to have some sinus tachycardia.  Hypoxia has improved, initially was requiring high flow nasal cannula, now down to 4 L/min.  - repeat troponin, CBC, CMP  - continue heparin drip  - will reevaluate later this evening    Addendum: Patient is hemodynamically stable but is at risk for decompensation with his abnormal RV function and elevated troponin.  At this time he seems to have improved with anticoagulation.  Tachycardia improving.  Troponin is downtrending. No pain. Hypoxia improving.  I do not think thrombolytics or embolectomy are indicated at this time, given the decreased benefit they would provide in a situation where the patient is already improving with anticoagulation. Discussed with patient.      # Confirmed COVID-19 infection    # Acute Hypoxic Respiratory Failure secondary to COVID-19 infection  # Viral Pneumonia secondary to COVID-19 infection     Symptom Onset 11/11/2021   Date of " 1st Positive Test 11/23/2021   Vaccination Status Declines Vaccine       I suspect much of his symptoms and his hypoxia are secondary to his PE rather than the COVID-19 but given that it is impossible to know and  that he does certainly has radiological evidence of COVID-19 we will treat him for it.    - Admit to medical floor with continuous pulse ox, COVID-19 special precautions  - Oxygen: continue current support with nasal cannula at 3-4 L/min; titrate to keep SpO2 between 90-96%  - Labs:    - Imaging: no additional imaging needed at this time  - Breathing treatments: no inhalers needed; avoid nebulizers in favor of MDIs   - IV fluids: not indicated at this time  - Antibiotics: not indicated   - COVID-Focused Medications: Dexamethasone 10 mg x 10 days or until hospital discharge, started on 11/23/2021 and Remdesivir x 5 days or until hospital discharge, started on 11/23/2021.  Patient was placed on dexamethasone 10 mg in the ER.  Given that his CRP was 91 I think this is reasonable and we will continue this higher dose.  - DVT Prophylaxis: at high risk of thrombotic complications due to COVID-19 (DDimer = >20.00 ug/mL FEU (Ref range: 0.00 - 0.50 ug/mL FEU) ).          - Already on therapeutic anticoagulation with Heparin drip        - consider anticoag on discharge for 30 days & until return to normal mobility        Clinically Significant Risk Factors Present on Admission             # Coagulation Defect: INR = 1.41 (Ref range: 0.85 - 1.15) and/or PTT = 33 Seconds (Ref range: 22 - 38 Seconds) on admission, will monitor for bleeding            Diet: Combination Diet Regular Diet Adult    DVT Prophylaxis: heparin drip as above  Friend Catheter: Not present  Code Status: Full Code  Lines: none    Disposition Plan   Expected discharge: 2-3 days  Entered: Braydon Lunsford MD 11/24/2021, 8:41 PM     Status: Patient is appropriate for med surg   Braydon Lunsford MD        The patient's care was discussed with the  Patient.    Primary Care Physician   Clinic, [facility] [phone]    History is obtained from the patient, and review of old records via the EMR.    History of Present Illness   Rafita Herrera is a 56 year old male without significant past medical history who now presents on 11/23/2021 with chest pain.    Nov 11 - cough started, worse in the AM. Didn't impact activity, was able to work outside    On 11/15, patient underwent testing for COVID-19 at an outside lab.  Rapid antigen test was negative.  Confirmatory PCR testing, which he received results from on 11/19, was also negative.  Interestingly, his 2 kids both tested positive for COVID-19 on 11/15 when he was tested.  His wife was tested the following day on 11/16 with a PCR test and this was negative.    Tues Nov 16 - became very fatigued, less appeite, but taste/smell was normal.     Chest x-ray at the [facility] 11/22 notable for bilateral patchy infiltrates.  He was initiated on doxycycline and Augmentin and encouraged to repeat a Covid test.     Then 11/23 woke up with cough, felt like pulled muscle --> difficult to breath. Pain was exacerbated by movements and deep inspirations.    Patient initially presented to the ER 11/23 around 6 PM.  D-dimer was markedly elevated and Covid test was positive.  He underwent a CT scan that evening revealing extensive bilateral pulmonary emboli.  Case was discussed with the PE team at [facility] who recommended echocardiogram and initiation of heparin.  There was consideration of transferring for IR thrombectomy but there were not any beds available for postprocedural care and so he was kept here in the hospital.  Subsequently echocardiogram reveals dilated RV and mild to moderate pulmonary hypertension with normal LVEF.  Patient reports that since initiation of treatment in the ER, his pain has improved and shortness of breath is improved as well.  He is feeling much better.  He remains on  3 to 4 L of oxygen nasal cannula and is currently saturating 97%.    Patient does note that he experienced some tightness in his right calf yesterday but this has subsequently resolved.    Review of Systems   The 10 point Review of Systems is negative other than noted in the HPI or here.     Past Medical History      Past Medical History:   Diagnosis Date     Acute upper respiratory infections of unspecified site      Lumbago      Vitiligo      Patient Active Problem List    Diagnosis Date Noted     Multiple subsegmental pulmonary emboli without acute cor pulmonale (H) 11/24/2021     Priority: Medium     Acute respiratory failure with hypoxia (H) 11/23/2021     Priority: Medium     Acute pulmonary embolism with acute cor pulmonale, unspecified pulmonary embolism type (H) 11/23/2021     Priority: Medium     Pneumonia due to 2019 novel coronavirus 11/23/2021     Priority: Medium     Hyperlipidemia with target LDL less than 130 01/22/2014     Priority: Medium     Diagnosis updated by automated process. Provider to review and confirm.       Hypertension 10/14/2013     Priority: Medium     Atenolol caused bradycardia at 50 mg daily. Hytrin likely caused side effects.           Past Surgical History   No past surgical history    Prior to Admission Medications   Prior to Admission Medications   Prescriptions Last Dose Informant Patient Reported? Taking?   IBUPROFEN 200 200 MG OR TABS Unknown at Unknown time  Yes Yes   amoxicillin-clavulanate (AUGMENTIN) 875-125 MG tablet Unknown at Unknown time  No Yes   Sig: Take 1 tablet by mouth 2 times daily for 7 days   doxycycline hyclate (VIBRA-TABS) 100 MG tablet Unknown at Unknown time  No Yes   Sig: Take 1 tablet (100 mg) by mouth 2 times daily for 7 days      Facility-Administered Medications: None     Allergies   No Known Allergies    Family History    Family History   Problem Relation Age of Onset     Lipids Father      Allergies Father         HAYFEVER     Heart Disease  Father         bypass and stents starting at age 50.     Allergies Sister         CATS     Alzheimer Disease Other         POSSIBLY AUNT ON FATHERS SIDE     Allergies Sister         HAYFEVER     Hypertension Mother         Taking generic Lopressor.   Mother had a GYN cancer.    Social History   Social History     Socioeconomic History     Marital status:      Spouse name: Not on file     Number of children: Not on file     Years of education: Not on file     Highest education level: Not on file   Occupational History     Not on file   Tobacco Use     Smoking status: Never Smoker     Smokeless tobacco: Never Used   Substance and Sexual Activity     Alcohol use: Yes     Comment: socially- less than 7 drinks per week.     Drug use: No     Sexual activity: Never   Other Topics Concern     Parent/sibling w/ CABG, MI or angioplasty before 65F 55M? Yes   Social History Narrative    ** Merged History Encounter **          Social Determinants of Health     Financial Resource Strain: Not on file   Food Insecurity: Not on file   Transportation Needs: Not on file   Physical Activity: Not on file   Stress: Not on file   Social Connections: Not on file   Intimate Partner Violence: Not on file   Housing Stability: Not on file       Physical Exam   BP (!) 154/95 (BP Location: Right arm)   Pulse 118   Temp 97.6  F (36.4  C) (Oral)   Resp 18   Ht 1.829 m (6')   Wt 97.7 kg (215 lb 6.2 oz)   SpO2 95%   BMI 29.21 kg/m       Weight: 215 lbs 6.23 oz Body mass index is 29.21 kg/m .     Constitutional: Alert, oriented, cooperative, no apparent distress, appears nontoxic,  Eyes: Eyes are clear, pupils are reactive.  HENT: Oropharynx is clear and moist. No evidence of cranial trauma.  Lymph/Hematologic: No epitrochlear, axillary, anterior or posterior cervical, or supraclavicular lymphadenopathy is appreciated.  Cardiovascular: Regular rate and rhythm, normal S1 and S2, and no murmur noted. JVP is normal. Good peripheral pulses  in wrists bilaterally. No lower extremity edema.  Respiratory: Clear to auscultation bilaterally.   GI: Soft, non-tender, normal bowel sounds, no hepatomegaly.  Genitourinary: Deferred  Musculoskeletal: Normal muscle bulk and tone.  Skin: Warm and dry, no rashes.   Neurologic: Neck supple. Cranial nerves are grossly intact.  is symmetric.     Data   Data reviewed today:   Recent Labs   Lab 11/24/21 2013 11/24/21  0431 11/23/21  2226 11/23/21  2124 11/23/21  2042   WBC 13.6*  --   --  8.9  --    HGB 14.7  --   --  13.2*  --    MCV 89  --   --  90  --      --   --  259  --    INR  --   --  1.41*  --   --      --   --   --  134   POTASSIUM 4.2  --   --   --  4.5   CHLORIDE 106  --   --   --  101   CO2 23  --   --   --  24   BUN 22  --   --   --  21   CR 0.87  --   --   --  0.99   ANIONGAP 9  --   --   --  9   PAOLA 8.7  --   --   --  8.9   *  --   --   --  113*   ALBUMIN 2.4*  --   --   --  2.7*   PROTTOTAL 7.6  --   --   --  8.3   BILITOTAL 0.6  --   --   --  0.9   ALKPHOS 86  --   --   --  92   ALT 49  --   --   --  64   AST 30  --   --   --  59*   TROPONIN  --  1.454*  --   --  2.934*       Recent Results (from the past 24 hour(s))   CT Chest Pulmonary Embolism w Contrast   Result Value    Radiologist flags Pulmonary embolism (AA)    Narrative    EXAM: CT CHEST PULMONARY EMBOLISM W CONTRAST  LOCATION: [facility]  DATE/TIME: 11/23/2021 9:07 PM    INDICATION: Severe left lower chest pain, pneumonia on CXR.  COMPARISON: None.  TECHNIQUE: CT chest pulmonary angiogram during arterial phase injection of IV contrast. Multiplanar reformats and MIP reconstructions were performed. Dose reduction techniques were used.   CONTRAST: 78 mL Isovue 370.    FINDINGS:  ANGIOGRAM CHEST: Fairly extensive bilateral pulmonary emboli including first-order branches of the right and left pulmonary arteries. Thoracic aorta is negative for dissection. There is flattening of the  interventricular septum concerning for right heart   strain.    LUNGS AND PLEURA: Moderately extensive infiltrates. No effusions.    MEDIASTINUM/AXILLAE: No adenopathy or aneurysm.    CORONARY ARTERY CALCIFICATION: None.    UPPER ABDOMEN: No acute findings.    MUSCULOSKELETAL: No frankly destructive bony lesions.      Impression    IMPRESSION:  1.  Positive study for pulmonary emboli, large clot burden.  2.  Moderately extensive infiltrates.      [Critical Result: Pulmonary embolism]    Finding was identified on 2021 9:31 PM.     Dr. Todd was contacted by me on 2021 9:36 PM and verbalized understanding of the critical result.    Echocardiogram Complete   Result Value    LVEF  65-70%    Narrative    [id number]  [id number]  [id number]  [id number]^PEYTON^NAOMI^L     [facility]  Echocardiography Laboratory  [address] [place] [address]     Name: JUSTINA TAYLOR  MRN: [id number]  : 1965  Study Date: 2021 10:44 AM  Age: 56 yrs  Gender: Male  Patient Location: [facility]  Reason For Study: Pulmonary Embolism  Ordering Physician: NAOMI TODD  Referring Physician: [facility]  Performed By: Deandra Stephens RDCS     BSA: 0.30 m2  Height: 72 in  Weight: 2 lb  HR: 76  BP: 140/96 mmHg  ______________________________________________________________________________  Procedure  Complete Portable Echo Adult. Optison (NDC #[id number]) given intravenously.  Complete Echo Adult.  ______________________________________________________________________________  Interpretation Summary     Right ventricle and right atrium not clearly seen. RV appears dilated based on  limited views.  Right ventricular systolic pressure is elevated, consistent with mild to  moderate pulmonary hypertension.  The visual ejection fraction is 65-70%.  The study was technically difficult. Contrast was used without  apparent  complications.  ______________________________________________________________________________  Left Ventricle  The left ventricle is normal in size. There is normal left ventricular wall  thickness. The visual ejection fraction is 65-70%. Left ventricular diastolic  function is not assessable. No regional wall motion abnormalities noted.     Right Ventricle  The right ventricle is normal in structure, function and size.     Atria  Normal left atrial size. Right atrium not well visualized. There is no color  Doppler evidence of an atrial shunt.     Mitral Valve  The mitral valve is normal in structure and function.     Tricuspid Valve  The tricuspid valve is not well visualized. Right ventricular systolic  pressure is elevated, consistent with mild to moderate pulmonary hypertension.     Aortic Valve  The aortic valve is normal in structure and function.     Pulmonic Valve  The pulmonic valve is not well visualized.     Vessels  Normal size aorta. The ascending aorta is Mildly dilated. The inferior vena  cava is normal.     Pericardium  The pericardium appears normal.     Rhythm  Sinus rhythm was noted.  ______________________________________________________________________________  MMode/2D Measurements & Calculations  IVSd: 1.1 cm     LVIDd: 4.7 cm  LVIDs: 2.9 cm  LVPWd: 1.2 cm  FS: 38.1 %  LV mass(C)d: 194.2 grams  LV mass(C)dI: 645.3 grams/m2  Ao root diam: 3.4 cm  LA dimension: 3.0 cm  asc Aorta Diam: 4.0 cm  LA/Ao: 0.88  LA Volume (BP): 46.0 ml  LA Volume Index (BP): 21.6 ml/m2  RWT: 0.50     Doppler Measurements & Calculations  MV E max alan: 48.5 cm/sec  MV A max alan: 62.2 cm/sec  MV E/A: 0.78  MV dec time: 0.21 sec  PA acc time: 0.07 sec  TR max alan: 262.4 cm/sec  TR max P.5 mmHg  E/E' av.6  Lateral E/e': 5.3  Medial E/e': 6.0     ______________________________________________________________________________  Report approved by: Brigitte Givens 2021 11:55 AM             I personally  reviewed the chest CT image(s) showing Bilateral patchy infiltrates from COVID-19 along with some pulmonary emboli

## 2021-11-25 NOTE — PROGRESS NOTES
"[facility]    Medicine Progress Note - Hospitalist Service       Date of Admission:  11/23/2021    Assessment & Plan    Rafita Herrera is a 56 year old male who presents on 11/23/2021 with COVID-19 and pulmonary embolism resulting in acute hypoxic respiratory failure.    Acute respiratory failure with hypoxia  Presented with hypoxia initially requiring HFNC in the emergency department, weaned to 4L NC before admission to the floor.  Initial VBG within normal limits. Work-up revealed both COVID-19 infection and acute PE, cause of hypoxia is multifactorial due to these two things.  - Continue supplemental O2 to maintain sats > 91%, wean as able  - Treat PE and COVID infection as noted below    Acute PE  Multiple subsegmental pulmonary emboli without acute cor pulmonale  CT PE 11/23 9 pm:  \"Fairly extensive bilateral pulmonary emboli including first-order branches of the right and left pulmonary arteries.\" Occurs in the setting of active COVID-19 infection.   Case was initially discussed between emergency department and PERT team with consideration of transferring for IR thrombectomy, but there were not any beds available for postprocedural care and so he was kept here at [facility] and started on heparin drip.  Hemodynamically stable. BP WNL. Continues to have some sinus tachycardia.    Echocardiogram 11/24 shows RV dilation (although not well visualized) and evidence of moderate pulmonary hypertension.     Overall symptoms are improving with treatment outlined below. Troponin down trending, vitals remain stable, O2 needs trending down. Consideration of thrombolytics or embolectomy no longer likely indicated based on improvement with heparin.     - repeat troponin, CBC, CMP  - continue heparin drip  - Address hypoxia above    Confirmed COVID-19 infection    Viral Pneumonia secondary to COVID-19 infection     Symptom Onset 11/11/2021   Date of 1st Positive Test 11/23/2021   Vaccination " Status Declines Vaccine         Suspect that much of his symptoms and hypoxia are secondary to his PE rather than the COVID-19 but given that it is impossible to know and that he does certainly have radiological evidence of COVID-19 we will treat him for it.    COVID labs:  CRP 91.2 -- 90.6 -- 73.7  Ferritin 954  Fibrinogen 506    D-dimer > 20 -- > 20  Troponin see below     - Admit to medical floor with continuous pulse ox, COVID-19 special precautions  - Oxygen: continue current support with nasal cannula at 2.5 L/min; titrate to keep SpO2 between 90-96%  - Labs:  Ordered per COVID protocol (CBC, BMP, CRP, D dimer, and fibrinogen)  - Imaging: no additional imaging needed at this time  - Breathing treatments: no inhalers needed; avoid nebulizers in favor of MDIs   - IV fluids: not indicated at this time  - Antibiotics: not indicated   - COVID-Focused Medications:     Dexamethasone 10 mg x 10 days or until hospital discharge, started on 11/23/2021. Patient was placed on dexamethasone 10 mg in the ER.  Given that his CRP was 91, it is reasonable to continue this higher dose.    Remdesivir x 5 days or until hospital discharge, started on 11/23/2021.   - DVT Prophylaxis: at high risk of thrombotic complications due to COVID-19 (DDimer = >20.00 ug/mL FEU (Ref range: 0.00 - 0.50 ug/mL FEU) ).          - Already on therapeutic anticoagulation with Heparin drip        - consider anticoagulation on discharge for 30 days & until return to normal mobility    Elevated troponin / Type 2 NSTEMI  Admit troponin 2.934. Likely due to cardiac strain from PE, not true acute coronary syndrome.   Troponin I initially 2.934 ? 1.454 -- 1.024 -- 0.641.   - Troponin in am        Diet: Combination Diet Regular Diet Adult    DVT Prophylaxis: Heparin drip  Friend Catheter: Not present  Central Lines: None  Code Status: Full Code      Disposition Plan   Expected discharge:  2-4 more days, recommended to prior living arrangement once  "improvement in respiratory status and weaned from supplemental O2 vs home on supplemental O2, clinical improvement, anticoagulation plan established, and clinically improving.     The patient's care was discussed with the Attending Physician, Dr. Phoenix De Jesus and Patient.    Elvia Godoy PA-C  Hospitalist Service  [facility]  Securely message with the Vocera Web Console (learn more here)  Text page via [facility] Paging/Directory      ______________________________________________________________________    Interval History   Initially had significant pain in rib cage, especially when coughing or taking a deep breath; pain is now fully resolved except for when he coughs.   Denies any shortness of breath or wheezes.  Has an occasional dry cough, mostly occurs after waking.   No dizziness / lightheadedness, chest pain, palpitations.    Denies any fevers or chills.   Has maintained tastes and smell since symptom onset.  Did have fatigue and cough at the beginning of illness.   Appetite is \"OK, but slow.\"  Urinating normally.  Having normal bowel movements, last was yesterday at 3 which was normal.    Data reviewed today: I reviewed all medications, new labs and imaging results over the last 24 hours. I personally reviewed the chest CT image(s) showing mild to moderate bilateral groundglass infiltrates consistent with COVID, as well as bilateral pulmonary emboli.    Physical Exam   Vital Signs: Temp: 97.5  F (36.4  C) Temp src: Oral BP: 112/77 Pulse: 95   Resp: 18 SpO2: 97 % O2 Device: Nasal cannula Oxygen Delivery: 2.5 LPM  Weight: 215 lbs 6.23 oz    General appearance: Awake, alert, and in no apparent distress. Pleasant and conversational, speaking in full sentences.  CV: Regular rhythm & rate, no murmurs. No edema. Peripheral pulses intact.  Respiratory: No-labored breathing on 2.5L NC. Moving air well bilaterally, no wheezing, crackles, or rhonchi.  GI: Non-distended, soft, nontender " to palpation. No rebound or guarding. Normoactive bowel sounds.  Skin: Warm, dry, no rashes or ecchymoses. No mottling of skin. Areas of hypopigmentation on forearms and lower extremities consistent with vitiligo.  Musculoskeletal / extremities: Moves all extremities equally, no obvious abnormalities. Distal CMS intact.  Neurologic: No focal deficits.      Data   Recent Labs   Lab 11/25/21  0559 11/24/21 2013 11/24/21  0431 11/23/21  2226 11/23/21 2124 11/23/21  2042   WBC 13.0* 13.6*  --   --  8.9  --    HGB 14.3 14.7  --   --  13.2*  --    MCV 90 89  --   --  90  --     349  --   --  259  --    INR  --   --   --  1.41*  --   --     138  --   --   --  134   POTASSIUM 4.9 4.2  --   --   --  4.5   CHLORIDE 105 106  --   --   --  101   CO2 26 23  --   --   --  24   BUN 25 22  --   --   --  21   CR 1.08 0.87  --   --   --  0.99   ANIONGAP 7 9  --   --   --  9   PAOLA 8.6 8.7  --   --   --  8.9   * 145*  --   --   --  113*   ALBUMIN  --  2.4*  --   --   --  2.7*   PROTTOTAL  --  7.6  --   --   --  8.3   BILITOTAL  --  0.6  --   --   --  0.9   ALKPHOS  --  86  --   --   --  92   ALT  --  49  --   --   --  64   AST  --  30  --   --   --  59*   TROPONIN 0.641* 1.024* 1.454*  --   --  2.934*     No results found for this or any previous visit (from the past 24 hour(s)).  Medications     heparin 1,550 Units/hr (11/25/21 0411)     - MEDICATION INSTRUCTIONS -         remdesivir  100 mg Intravenous Q24H    And     sodium chloride 0.9%  50 mL Intravenous Q24H     dexamethasone  10 mg Intravenous Q24H     sodium chloride (PF)  3 mL Intracatheter Q8H

## 2021-11-25 NOTE — PLAN OF CARE
[place] [facility] [place] ADMISSION NOTE    Patient admitted to room 2313 at approximately 1800 via cart from emergency room. Patient was accompanied by transport tech.     Verbal SBAR report received from Lalit MCNEAL prior to patient arrival.     Patient ambulated to bed with stand-by assist. Patient alert and oriented X 3. The patient is not having any pain.  . Admission vital signs: Blood pressure (!) 154/95, pulse 118, temperature 97.6  F (36.4  C), temperature source Oral, resp. rate 18, height 1.829 m (6'), weight 97.7 kg (215 lb 6.2 oz), SpO2 95 %. Patient was oriented to plan of care, call light, bed controls, tv, telephone, bathroom and visiting hours.     Risk Assessment    The following safety risks were identified during admission: none. Yellow risk band applied: NO.     Skin Initial Assessment    This writer assisted patient to bed, declined full skin check.   Jason score in the Adult PCS flowsheet. Appropriate interventions initiated as needed.     Secondary skin check not completed.       Education    Patient has a Bruce to Observation order: No  Observation education completed and documented: N/A      Mary Schirmers, RN

## 2021-11-26 VITALS
TEMPERATURE: 98.3 F | RESPIRATION RATE: 18 BRPM | DIASTOLIC BLOOD PRESSURE: 73 MMHG | HEIGHT: 72 IN | OXYGEN SATURATION: 95 % | BODY MASS INDEX: 29.17 KG/M2 | WEIGHT: 215.39 LBS | SYSTOLIC BLOOD PRESSURE: 119 MMHG | HEART RATE: 78 BPM

## 2021-11-26 LAB
ANION GAP SERPL CALCULATED.3IONS-SCNC: 6 MMOL/L (ref 3–14)
BUN SERPL-MCNC: 28 MG/DL (ref 7–30)
CALCIUM SERPL-MCNC: 8.4 MG/DL (ref 8.5–10.1)
CHLORIDE BLD-SCNC: 107 MMOL/L (ref 94–109)
CO2 SERPL-SCNC: 26 MMOL/L (ref 20–32)
CREAT SERPL-MCNC: 1 MG/DL (ref 0.66–1.25)
CRP SERPL-MCNC: 32.5 MG/L (ref 0–8)
D DIMER PPP FEU-MCNC: 10.31 UG/ML FEU (ref 0–0.5)
ERYTHROCYTE [DISTWIDTH] IN BLOOD BY AUTOMATED COUNT: 11.5 % (ref 10–15)
FIBRINOGEN PPP-MCNC: 421 MG/DL (ref 170–490)
GFR SERPL CREATININE-BSD FRML MDRD: 84 ML/MIN/1.73M2
GLUCOSE BLD-MCNC: 150 MG/DL (ref 70–99)
HCT VFR BLD AUTO: 40.5 % (ref 40–53)
HGB BLD-MCNC: 13.5 G/DL (ref 13.3–17.7)
MCH RBC QN AUTO: 30.3 PG (ref 26.5–33)
MCHC RBC AUTO-ENTMCNC: 33.3 G/DL (ref 31.5–36.5)
MCV RBC AUTO: 91 FL (ref 78–100)
PLATELET # BLD AUTO: 352 10E3/UL (ref 150–450)
POTASSIUM BLD-SCNC: 4.7 MMOL/L (ref 3.4–5.3)
RBC # BLD AUTO: 4.45 10E6/UL (ref 4.4–5.9)
SODIUM SERPL-SCNC: 139 MMOL/L (ref 133–144)
TROPONIN I SERPL HS-MCNC: 214 NG/L
TROPONIN I SERPL-MCNC: 0.21 UG/L (ref 0–0.04)
WBC # BLD AUTO: 10.8 10E3/UL (ref 4–11)

## 2021-11-26 PROCEDURE — 85379 FIBRIN DEGRADATION QUANT: CPT | Performed by: HOSPITALIST

## 2021-11-26 PROCEDURE — 85384 FIBRINOGEN ACTIVITY: CPT | Performed by: HOSPITALIST

## 2021-11-26 PROCEDURE — 84484 ASSAY OF TROPONIN QUANT: CPT | Performed by: PHYSICIAN ASSISTANT

## 2021-11-26 PROCEDURE — 36415 COLL VENOUS BLD VENIPUNCTURE: CPT | Performed by: HOSPITALIST

## 2021-11-26 PROCEDURE — 250N000013 HC RX MED GY IP 250 OP 250 PS 637: Performed by: INTERNAL MEDICINE

## 2021-11-26 PROCEDURE — 85027 COMPLETE CBC AUTOMATED: CPT | Performed by: HOSPITALIST

## 2021-11-26 PROCEDURE — 99238 HOSP IP/OBS DSCHRG MGMT 30/<: CPT | Performed by: INTERNAL MEDICINE

## 2021-11-26 PROCEDURE — 86140 C-REACTIVE PROTEIN: CPT | Performed by: HOSPITALIST

## 2021-11-26 PROCEDURE — 80048 BASIC METABOLIC PNL TOTAL CA: CPT | Performed by: HOSPITALIST

## 2021-11-26 RX ORDER — APIXABAN 5 MG (74)
KIT ORAL
Qty: 1 EACH | Refills: 0 | Status: SHIPPED | OUTPATIENT
Start: 2021-11-26 | End: 2021-11-26

## 2021-11-26 RX ORDER — APIXABAN 5 MG (74)
KIT ORAL
Qty: 1 EACH | Refills: 0 | Status: SHIPPED | OUTPATIENT
Start: 2021-11-26 | End: 2021-12-25

## 2021-11-26 RX ADMIN — APIXABAN 10 MG: 5 TABLET, FILM COATED ORAL at 08:31

## 2021-11-26 ASSESSMENT — ACTIVITIES OF DAILY LIVING (ADL)
ADLS_ACUITY_SCORE: 5

## 2021-11-26 NOTE — DISCHARGE SUMMARY
"[facility]  Discharge Summary  Hospital Medicine       Date of Admission:  11/23/2021  Date of Discharge:  11/26/2021  4:00 PM  Discharging Provider: Phoenix De Jesus MD      Identification and Chief Compaint: Rafita Herrera is a 56 year old male who presented on 11/23/2021 with complaint of chest pain.    Discharge Diagnoses   Acute pulmonary embolism with RV strain  Acute respiratory failure with hypoxia due to acute PE and COVID-19 pneumonia  Confirmed COVID-19 infection    Viral Pneumonia secondary to COVID-19 infection  Type 2 NSTEMI due to PE     Follow-ups Needed After Discharge   Follow-up Appointments     Follow-up and recommended labs and tests       Follow up with primary care provider, [facility],   within 7 days for hospital follow- up.  The following labs/tests are   recommended: Consider pulmonary function testing prior to any [place] trips to   [place] this winter..             Unresulted Labs Ordered in the Past 30 Days of this Admission     Date and Time Order Name Status Description    11/23/2021 10:02 PM Blood Culture Hand, Right Preliminary     11/23/2021 10:02 PM Blood Culture Hand, Left Preliminary       These results will be followed up by Dr. De Jesus if significant.    Hospital Course      Acute respiratory failure with hypoxia due to acute PE and COVID-19 pneumonia    Presented with hypoxia initially requiring HFNC in the emergency department, weaned to 4L NC before admission to the floor.  Initial VBG within normal limits. Work-up revealed both COVID-19 infection and acute PE, cause of hypoxia is multifactorial due to these two things.    Patient improved significantly and suspect the PE as a significant factor in the hypoxemia. He was able to wean off oxygen prior to discharge.      Acute PE with RV strain  Multiple subsegmental pulmonary emboli without acute cor pulmonale    CT PE 11/23 9 pm:  \"Fairly extensive bilateral pulmonary " "emboli including first-order branches of the right and left pulmonary arteries.\" Occurs in the setting of active COVID-19 infection.     Case was initially discussed between emergency department and PERT team with consideration of transferring for IR thrombectomy, but there were not any beds available for postprocedural care and so he was kept here at [facility] and started on heparin drip. Echocardiogram 11/24 shows RV dilation (although not well visualized) and evidence of moderate pulmonary hypertension.     Overall symptoms improved significantly with treatment outlined below. Troponin down trending, vitals remain stable, O2 needs trended down and able to wean off.     We discussed oral anticoagulants, and he would like to use apixaban. He feels he can be consistent with BID doses. I recommend minimum 3 months but if doing well with anticoagulation, 6 months may be most appropriate. I don't think he needs lifelong anticoagulation given this was likely provoked by the COVID-19 infection.      Confirmed COVID-19 infection    Viral Pneumonia secondary to COVID-19 infection     Symptom Onset 11/11/2021   Date of 1st Positive Test 11/23/2021   Vaccination Status Declines Vaccine            Suspect that much of his symptoms and hypoxia are secondary to his PE rather than the COVID-19 but given that it is impossible to know and that he does certainly have radiological evidence of COVID-19 we will treat him for it.     COVID labs:  CRP 91.2 -- 90.6 -- 73.7 -- 32.5  Ferritin 954  Fibrinogen 506    D-dimer > 20 -- > 20 --> 10.31    - COVID-Focused Medications:   ? Dexamethasone 10 mg x 10 days or until hospital discharge, started on 11/23/2021. Patient was placed on dexamethasone 10 mg in the ER.  Given that his CRP was 91, it is reasonable to continue this higher dose. This was stopped on discharge.   ? Remdesivir x 5 days or until hospital discharge, started on 11/23/2021. This was stopped on discharge. " "     Elevated troponin / Type 2 NSTEMI    Admit troponin 2.934. Likely due to cardiac strain from PE, not true acute coronary syndrome.   Troponin I 2.934 ? 1.454 -- 1.024 -- 0.641 -- 0.211    Resolving.     Consultations This Hospital Stay   PHARMACY IP CONSULT    Code Status   Full Code    The discharge plan was discussed with the patient, and he expressed understanding.     Time Spent on this Encounter   Total time on this discharge was 25 minutes.       Phoenix De Jesus MD  [facility]  ______________________________________________________________________    Physical Exam   Vital Signs: Temp: 98.3  F (36.8  C) Temp src: Oral BP: 119/73 Pulse: 78   Resp: 18 SpO2: 95 % O2 Device: None (Room air) Oxygen Delivery: 1 LPM  Weight: 215 lbs 6.23 oz  Constitutional: alert and oriented, NAD, nontoxic  CV: Regular  Respiratory: CTA bilaterally  GI: Soft, non-tender   Skin: Warm and dry       Primary Care Physician   [place] [id number]     Discharge Disposition   Discharged to home  Condition at discharge: Stable    Significant Results and Procedures   Results for orders placed or performed during the hospital encounter of 11/23/21   Chest XR,  PA & LAT    Narrative    EXAM: XR CHEST 2 VW  LOCATION: [facility]  DATE/TIME: 11/23/2021 7:30 PM    INDICATION: \"pneumonia\" , evalaute for pneumothorax. Rib pain.  COMPARISON: 11/22/2021      Impression    IMPRESSION: No significant change. Patchy airspace infiltrates present bilaterally, left worse than right consistent with pneumonia and very consistent with COVID pneumonia. There is no pneumothorax or pleural effusion evident. Heart size normal. No   fractures identified.   CT Chest Pulmonary Embolism w Contrast     Value    Radiologist flags Pulmonary embolism (AA)    Narrative    EXAM: CT CHEST PULMONARY EMBOLISM W CONTRAST  LOCATION: [facility]" CENTER  DATE/TIME: 2021 9:07 PM    INDICATION: Severe left lower chest pain, pneumonia on CXR.  COMPARISON: None.  TECHNIQUE: CT chest pulmonary angiogram during arterial phase injection of IV contrast. Multiplanar reformats and MIP reconstructions were performed. Dose reduction techniques were used.   CONTRAST: 78 mL Isovue 370.    FINDINGS:  ANGIOGRAM CHEST: Fairly extensive bilateral pulmonary emboli including first-order branches of the right and left pulmonary arteries. Thoracic aorta is negative for dissection. There is flattening of the interventricular septum concerning for right heart   strain.    LUNGS AND PLEURA: Moderately extensive infiltrates. No effusions.    MEDIASTINUM/AXILLAE: No adenopathy or aneurysm.    CORONARY ARTERY CALCIFICATION: None.    UPPER ABDOMEN: No acute findings.    MUSCULOSKELETAL: No frankly destructive bony lesions.      Impression    IMPRESSION:  1.  Positive study for pulmonary emboli, large clot burden.  2.  Moderately extensive infiltrates.      [Critical Result: Pulmonary embolism]    Finding was identified on 2021 9:31 PM.     Dr. Todd was contacted by me on 2021 9:36 PM and verbalized understanding of the critical result.    Echocardiogram Complete     Value    LVEF  65-70%    Narrative    [id number]  [id number]  [id number]  [id number]^PEYTON^NAOMI^L     [facility]  Echocardiography Laboratory  [facility] [place] [address]     Name: JUSTINA TAYLOR  MRN: [id number]  : 1965  Study Date: 2021 10:44 AM  Age: 56 yrs  Gender: Male  Patient Location: [facility]  Reason For Study: Pulmonary Embolism  Ordering Physician: NAOMI TODD  Referring Physician: [facility]  Performed By: Deandra Stephens RDCS     BSA: 0.30 m2  Height: 72 in  Weight: 2 lb  HR: 76  BP: 140/96 mmHg  ______________________________________________________________________________  Procedure  Complete Portable Echo Adult. Optison (NDC #[id number])  given intravenously.  Complete Echo Adult.  ______________________________________________________________________________  Interpretation Summary     Right ventricle and right atrium not clearly seen. RV appears dilated based on  limited views.  Right ventricular systolic pressure is elevated, consistent with mild to  moderate pulmonary hypertension.  The visual ejection fraction is 65-70%.  The study was technically difficult. Contrast was used without apparent  complications.  ______________________________________________________________________________  Left Ventricle  The left ventricle is normal in size. There is normal left ventricular wall  thickness. The visual ejection fraction is 65-70%. Left ventricular diastolic  function is not assessable. No regional wall motion abnormalities noted.     Right Ventricle  The right ventricle is normal in structure, function and size.     Atria  Normal left atrial size. Right atrium not well visualized. There is no color  Doppler evidence of an atrial shunt.     Mitral Valve  The mitral valve is normal in structure and function.     Tricuspid Valve  The tricuspid valve is not well visualized. Right ventricular systolic  pressure is elevated, consistent with mild to moderate pulmonary hypertension.     Aortic Valve  The aortic valve is normal in structure and function.     Pulmonic Valve  The pulmonic valve is not well visualized.     Vessels  Normal size aorta. The ascending aorta is Mildly dilated. The inferior vena  cava is normal.     Pericardium  The pericardium appears normal.     Rhythm  Sinus rhythm was noted.  ______________________________________________________________________________  MMode/2D Measurements & Calculations  IVSd: 1.1 cm     LVIDd: 4.7 cm  LVIDs: 2.9 cm  LVPWd: 1.2 cm  FS: 38.1 %  LV mass(C)d: 194.2 grams  LV mass(C)dI: 645.3 grams/m2  Ao root diam: 3.4 cm  LA dimension: 3.0 cm  asc Aorta Diam: 4.0 cm  LA/Ao: 0.88  LA Volume (BP): 46.0 ml  LA  Volume Index (BP): 21.6 ml/m2  RWT: 0.50     Doppler Measurements & Calculations  MV E max alan: 48.5 cm/sec  MV A max alan: 62.2 cm/sec  MV E/A: 0.78  MV dec time: 0.21 sec  PA acc time: 0.07 sec  TR max alan: 262.4 cm/sec  TR max P.5 mmHg  E/E' av.6  Lateral E/e': 5.3  Medial E/e': 6.0     ______________________________________________________________________________  Report approved by: Brigitte Givens 2021 11:55 AM             Procedures    None    Discharge Orders      COVID-19 GetWell Loop Referral      Reason for your hospital stay    Acute pulmonary embolism due to COVID-19 pneumonia     Contact provider    Contact your primary care provider if If increased trouble breathing, new arm/leg swelling, dizziness/passing out, falls, bleeding that doesn't stop, or uncontrolled pain.     Follow-up and recommended labs and tests     Follow up with primary care provider, [facility], within 7 days for hospital follow- up.  The following labs/tests are recommended: Consider pulmonary function testing prior to any ski trips to [place] this winter..     Discharge - Quarantine/Isolation Instruction    Date of symptom onset: 2021   Date of first positive test: 2021  Stay home and away from others (self-isolate) for at least 20 days from when your symptoms started And...   You've had no fevers and no medicine that reduces fever for 1 full day (24 hours)  And...   Your other symptoms have resolved (gotten better).     Activity    Your activity upon discharge: activity as tolerated     Diet    Follow this diet upon discharge:  Regular Diet Adult     Discharge Medications   Current Discharge Medication List      START taking these medications    Details   apixaban ANTICOAGULANT (ELIQUIS) 5 MG tablet Take 1 tablet (5 mg) by mouth 2 times daily  Qty: 60 tablet, Refills: 5    Associated Diagnoses: Other acute pulmonary embolism without acute cor pulmonale (H)      Apixaban Starter  Pack (ELIQUIS DVT/PE STARTER PACK) 5 MG TBPK Take 10 mg by mouth 2 times daily for 7 days, THEN 5 mg 2 times daily for 23 days.  Qty: 1 each, Refills: 0    Associated Diagnoses: Other acute pulmonary embolism without acute cor pulmonale (H)         CONTINUE these medications which have NOT CHANGED    Details   IBUPROFEN 200 200 MG OR TABS          STOP taking these medications       doxycycline hyclate (VIBRA-TABS) 100 MG tablet Comments:   Reason for Stopping:             Allergies   No Known Allergies

## 2021-11-26 NOTE — PLAN OF CARE
On room air, up independently. A&O, patient feels ready to discharge.   /73 (BP Location: Right arm)   Pulse 78   Temp 98.3  F (36.8  C) (Oral)   Resp 18   Ht 1.829 m (6')   Wt 97.7 kg (215 lb 6.2 oz)   SpO2 95%   BMI 29.21 kg/m

## 2021-11-26 NOTE — PLAN OF CARE
Patient alert and oriented x4. Up independently in room. Denies pain. Heparin drip stopped in the evening per orders. Saline locked. Patient is now on room air with sats >90%. Able to make needs known.     /82 (BP Location: Right arm)   Pulse 61   Temp 97.7  F (36.5  C) (Oral)   Resp 16   Ht 1.829 m (6')   Wt 97.7 kg (215 lb 6.2 oz)   SpO2 94%   BMI 29.21 kg/m

## 2021-11-26 NOTE — PROGRESS NOTES
[facility] DISCHARGE NOTE    Patient discharged to home at 4:02 PM via wheel chair. Accompanied by staff. Discharge instructions reviewed with patient, opportunity offered to ask questions. Prescriptions filled and sent with patient upon discharge. All belongings sent with patient.    Reba Kaufman RN

## 2021-11-26 NOTE — PLAN OF CARE
[facility] DISCHARGE [facility]    Patient to be discharged to home. Opportunity offered to ask questions. Prescriptions sent to patients preferred pharmacy. All belongings sent with patient.Money returned to patient in sealed envelope. Wife to  patient at 1600.    Lianna Renteria RN

## 2021-11-27 DIAGNOSIS — Z71.89 OTHER SPECIFIED COUNSELING: ICD-10-CM

## 2021-11-27 LAB
BACTERIA BLD CULT: ABNORMAL
BACTERIA BLD CULT: ABNORMAL

## 2021-11-29 ENCOUNTER — PATIENT OUTREACH (OUTPATIENT)
Dept: CARE COORDINATION | Facility: CLINIC | Age: 56
End: 2021-11-29
Payer: COMMERCIAL

## 2021-11-29 LAB — BACTERIA BLD CULT: NO GROWTH

## 2021-11-29 NOTE — PROGRESS NOTES
Clinic Care Coordination Contact  [facility]/Voicemail       Clinical Data: Care Coordinator Outreach  Reason for referral: TCM outreach  Outreach attempted x 1.  Left message on patient's voicemail with call back information and requested return call.  Plan:  Care Coordinator will try to reach patient again in 1-2 business days.       Gillian Stone  Community Health Worker  [facility] [place] [facility]  Ph: [phone]

## 2021-11-30 NOTE — PROGRESS NOTES
Clinic Care Coordination Contact  [facility]: Post-Discharge Note  SITUATION                                                      Admission:    Admission Date: 11/23/21   Reason for Admission: acute respiratory failure with hypoxia  Discharge:   Discharge Date: 11/26/21  Discharge Diagnosis: acute respiratory failure with hypoxia    BACKGROUND                                                      Rafita Herrera is a 56 year old male without significant past medical history who now presents on 11/23/2021 with chest pain.     Nov 11 - cough started, worse in the AM. Didn't impact activity, was able to work outside     On 11/15, patient underwent testing for COVID-19 at an outside lab.  Rapid antigen test was negative.  Confirmatory PCR testing, which he received results from on 11/19, was also negative.  Interestingly, his 2 kids both tested positive for COVID-19 on 11/15 when he was tested.  His wife was tested the following day on 11/16 with a PCR test and this was negative.     Tues Nov 16 - became very fatigued, less appeite, but taste/smell was normal.      Chest x-ray at the [facility] 11/22 notable for bilateral patchy infiltrates.  He was initiated on doxycycline and Augmentin and encouraged to repeat a Covid test.      Then 11/23 woke up with cough, felt like pulled muscle --> difficult to breath. Pain was exacerbated by movements and deep inspirations.     Patient initially presented to the ER 11/23 around 6 PM.  D-dimer was markedly elevated and Covid test was positive.  He underwent a CT scan that evening revealing extensive bilateral pulmonary emboli.  Case was discussed with the PE team at [facility] who recommended echocardiogram and initiation of heparin.  There was consideration of transferring for IR thrombectomy but there were not any beds available for postprocedural care and so he was kept here in the hospital.  Subsequently echocardiogram reveals dilated RV and mild to  moderate pulmonary hypertension with normal LVEF.  Patient reports that since initiation of treatment in the ER, his pain has improved and shortness of breath is improved as well.  He is feeling much better.  He remains on 3 to 4 L of oxygen nasal cannula and is currently saturating 97%.     Patient does note that he experienced some tightness in his right calf yesterday but this has subsequently resolved.    ASSESSMENT      Enrollment  Primary Care Care Coordination Status: Declined    Discharge Assessment  How are you doing now that you are home?: Not much has changed since I left the hospital  How are your symptoms? (Red Flag symptoms escalate to triage hotline per guidelines): Unchanged  Do you feel your condition is stable enough to be safe at home until your provider visit?: Yes  Does the patient have their discharge instructions? : Yes  Does the patient have questions regarding their discharge instructions? : No  Were you started on any new medications or were there changes to any of your previous medications? : No  Does the patient have all of their medications?: Yes  Do you have questions regarding any of your medications? : No  Do you have all of your needed medical supplies or equipment (DME)?  (i.e. oxygen tank, CPAP, cane, etc.): Yes  Discharge follow-up appointment scheduled within 14 calendar days? : Yes  Discharge Follow Up Appointment Date: 12/02/21  Discharge Follow Up Appointment Scheduled with?: Primary Care Provider    Post-op ([facility] CTA Only)  If the patient had a surgery or procedure, do they have any questions for a nurse?: No      PLAN                                                      Outpatient Plan:      Follow up with primary care provider, [facility], within 7 days for hospital follow- up. The following labs/tests are recommended: Consider pulmonary function testing prior to any ski trips to high [facility] this winter.    Future Appointments   Date Time Provider  Department Center   12/2/2021 11:30 AM Kristopher Penny PA-C [facility]         For any urgent concerns, please contact our 24 hour nurse triage line: [phone] ([phone])       Gillian Stone  Community Health Worker  [facility]  Ph: [phone]

## 2021-12-02 ENCOUNTER — VIRTUAL VISIT (OUTPATIENT)
Dept: FAMILY MEDICINE | Facility: CLINIC | Age: 56
End: 2021-12-02
Payer: COMMERCIAL

## 2021-12-02 DIAGNOSIS — I26.99 OTHER ACUTE PULMONARY EMBOLISM WITHOUT ACUTE COR PULMONALE (H): ICD-10-CM

## 2021-12-02 DIAGNOSIS — J96.01 ACUTE RESPIRATORY FAILURE WITH HYPOXIA (H): Primary | ICD-10-CM

## 2021-12-02 DIAGNOSIS — U07.1 PNEUMONIA DUE TO 2019 NOVEL CORONAVIRUS: ICD-10-CM

## 2021-12-02 DIAGNOSIS — J12.82 PNEUMONIA DUE TO 2019 NOVEL CORONAVIRUS: ICD-10-CM

## 2021-12-02 PROCEDURE — 99214 OFFICE O/P EST MOD 30 MIN: CPT | Mod: TEL | Performed by: PHYSICIAN ASSISTANT

## 2021-12-02 NOTE — PROGRESS NOTES
Rafita is a 56 year old who is being evaluated via a billable telephone visit.      What phone number would you like to be contacted at? [phone]  How would you like to obtain your AVS? Mail a copy    Assessment & Plan   Acute respiratory failure with hypoxia (H)  Stable since leaving the hospital.  Hospital notes were reviewed.  Comorbid pulmonary embolism.  There are no new or concerning symptoms. Discussed prognosis, and supportive treatment of their symptoms. Answered all questions. Call us prn for any new, changing or worsening symptoms. Warning signs and symptoms discussed on when to present to the ER.     Pneumonia due to 2019 novel coronavirus  As above.  Stable since leaving the hospital. No new symptoms or concerns. Warning signs and symptoms discussed on when to return to clinic or go to the ER. Pt verbalized understanding.      Other acute pulmonary embolism without acute cor pulmonale (H)  Hospital is reviewed.  Large clot burden seen on CT scan.  Doing well on Eliquis.  Continue this for now and follow-up in 2 to 3 months for recheck.     BMI:   Estimated body mass index is 29.21 kg/m  as calculated from the following:    Height as of 11/24/21: 1.829 m (6').    Weight as of 11/24/21: 97.7 kg (215 lb 6.2 oz).       No follow-ups on file.    Kristopher Penny PA-C  [facility]    Subjective   Rafita is a 56 year old who presents for the following health issues     HPI   Post Discharge Outreach 11/30/2021   Admission Date 11/23/2021   Reason for Admission acute respiratory failure with hypoxia   Discharge Date 11/26/2021   Discharge Diagnosis acute respiratory failure with hypoxia   How are you doing now that you are home? Not much has changed since I left the hospital   How are your symptoms? (Red Flag symptoms escalate to triage hotline per guidelines) Unchanged   Do you feel your condition is stable enough to be safe at home until your provider visit? Yes   Does the  patient have their discharge instructions?  Yes   Does the patient have questions regarding their discharge instructions?  No   Were you started on any new medications or were there changes to any of your previous medications?  No   Does the patient have all of their medications? Yes   Do you have questions regarding any of your medications?  No   Do you have all of your needed medical supplies or equipment (DME)?  (i.e. oxygen tank, CPAP, cane, etc.) Yes   Discharge follow-up appointment scheduled within 14 calendar days?  Yes   Discharge Follow Up Appointment Date 12/2/2021   Discharge Follow Up Appointment Scheduled with? Primary Care Provider     Hospital Follow-up Visit:    Hospital/Nursing Home/IP Rehab Facility: [facility]  Date of Admission: 11/23/2021  Date of Discharge: 11/26/2021  Reason(s) for Admission: Acute respiratory failure with hypoxia, acute pulmonary embolism without  Acute cor pulmonale, Pneumonia due to 2019 novel coronavirus, Multiple subsegmental pulmonary emboli without acute cor pulmonale       Was your hospitalization related to COVID-19? YES   How are you feeling today? Slightly better  In the past 24 hours have you had shortness of breath when speaking, walking, or climbing stairs? My breathing issues have improved  Do you have a cough? Yes, I have a cough but it's not worse  When is the last time you had a fever greater than 100? unsure  Are you having any other symptoms? Loss of appetite, Fatigue and Confusion   Do you have any other stressors you would like to discuss with your provider? OTHER: possible side effects to eliquis     PHQ Assesment Total Score(s) 3/28/2014   PHQ-2 Score 0   Some recent data might be hidden     No flowsheet data found.    Was the patient in the ICU or did the patient experience delirium during hospitalization?  Yes       Problems taking medications regularly:  None  Medication changes since discharge: None  Problems adhering to  non-medication therapy:  None    Summary of hospitalization:  [facility] discharge summary reviewed  Diagnostic Tests/Treatments reviewed.  Follow up needed: none  Other Healthcare Providers Involved in Patient s Care:         None  Update since discharge: improved.     Post Discharge Medication Reconciliation: discharge medications reconciled, continue medications without change.  Plan of care communicated with patient        Review of Systems   See HPI       Objective       Vitals:  No vitals were obtained today due to virtual visit.    Physical Exam   healthy, alert and no distress  PSYCH: Alert and oriented times 3; coherent speech, normal   rate and volume, able to articulate logical thoughts, able   to abstract reason, no tangential thoughts, no hallucinations   or delusions  His affect is normal  RESP: No cough, no audible wheezing, able to talk in full sentences  Remainder of exam unable to be completed due to telephone visits          Phone call duration: 15 minutes

## 2021-12-08 ENCOUNTER — OFFICE VISIT (OUTPATIENT)
Dept: FAMILY MEDICINE | Facility: CLINIC | Age: 56
End: 2021-12-08
Payer: COMMERCIAL

## 2021-12-08 VITALS
OXYGEN SATURATION: 97 % | BODY MASS INDEX: 28.29 KG/M2 | DIASTOLIC BLOOD PRESSURE: 76 MMHG | SYSTOLIC BLOOD PRESSURE: 136 MMHG | WEIGHT: 208.6 LBS | TEMPERATURE: 97.4 F | HEART RATE: 68 BPM

## 2021-12-08 DIAGNOSIS — J12.82 PNEUMONIA DUE TO 2019 NOVEL CORONAVIRUS: ICD-10-CM

## 2021-12-08 DIAGNOSIS — J96.01 ACUTE RESPIRATORY FAILURE WITH HYPOXIA (H): Primary | ICD-10-CM

## 2021-12-08 DIAGNOSIS — U07.1 PNEUMONIA DUE TO 2019 NOVEL CORONAVIRUS: ICD-10-CM

## 2021-12-08 DIAGNOSIS — I26.94 MULTIPLE SUBSEGMENTAL PULMONARY EMBOLI WITHOUT ACUTE COR PULMONALE (H): ICD-10-CM

## 2021-12-08 PROCEDURE — 99214 OFFICE O/P EST MOD 30 MIN: CPT | Performed by: PHYSICIAN ASSISTANT

## 2021-12-08 NOTE — PATIENT INSTRUCTIONS
Continue Eliquis as prescribed.     Continue to take it easy. If you change your mind about supplemental O2, do not hesitate to reach out to me.     I will follow-up with a phone call in 1 month to see how things are going.

## 2024-05-06 NOTE — PLAN OF CARE
A&Ox4. Up Ind in room. Uses urinal at bedside. Denies any pain or discomfort. SOB with exertion. Diminished lung sounds. Oxygen maintaining above 90% on 2.5 liters overnight. Heparin gtt running at 1,550 units/hr. No dose change overnight. Next Anti XA level is due to be drawn at 0915. Fluids encouraged. Afebrile.     BP (!) 119/91 (BP Location: Left arm)   Pulse 87   Temp 97.4  F (36.3  C) (Oral)   Resp 18   Ht 1.829 m (6')   Wt 97.7 kg (215 lb 6.2 oz)   SpO2 95%   BMI 29.21 kg/m      Siobhan Gutiérrez RN on 11/25/2021 at 2:54 AM     No

## 2024-10-03 ENCOUNTER — TELEPHONE (OUTPATIENT)
Dept: FAMILY MEDICINE | Facility: CLINIC | Age: 59
End: 2024-10-03

## 2024-10-03 ENCOUNTER — HOSPITAL ENCOUNTER (EMERGENCY)
Facility: CLINIC | Age: 59
Discharge: HOME | End: 2024-10-03
Attending: NURSE PRACTITIONER | Admitting: NURSE PRACTITIONER
Payer: COMMERCIAL

## 2024-10-03 VITALS
TEMPERATURE: 98.5 F | DIASTOLIC BLOOD PRESSURE: 98 MMHG | RESPIRATION RATE: 18 BRPM | SYSTOLIC BLOOD PRESSURE: 188 MMHG | HEART RATE: 84 BPM | OXYGEN SATURATION: 99 %

## 2024-10-03 DIAGNOSIS — I1A.0 RESISTANT HYPERTENSION: ICD-10-CM

## 2024-10-03 LAB
ALBUMIN SERPL BCG-MCNC: 4.4 G/DL (ref 3.5–5.2)
ALP SERPL-CCNC: 91 U/L (ref 40–150)
ALT SERPL W P-5'-P-CCNC: 35 U/L (ref 0–70)
ANION GAP SERPL CALCULATED.3IONS-SCNC: 8 MMOL/L (ref 7–15)
AST SERPL W P-5'-P-CCNC: 34 U/L (ref 0–45)
BILIRUB SERPL-MCNC: 0.7 MG/DL
BUN SERPL-MCNC: 11.6 MG/DL (ref 6–20)
CALCIUM SERPL-MCNC: 9.4 MG/DL (ref 8.8–10.4)
CHLORIDE SERPL-SCNC: 101 MMOL/L (ref 98–107)
CREAT SERPL-MCNC: 1.22 MG/DL (ref 0.67–1.17)
EGFRCR SERPLBLD CKD-EPI 2021: 69 ML/MIN/1.73M2
GLUCOSE SERPL-MCNC: 130 MG/DL (ref 70–99)
HCO3 SERPL-SCNC: 28 MMOL/L (ref 22–29)
POTASSIUM SERPL-SCNC: 4.9 MMOL/L (ref 3.4–5.3)
PROT SERPL-MCNC: 7.6 G/DL (ref 6.4–8.3)
SODIUM SERPL-SCNC: 137 MMOL/L (ref 135–145)

## 2024-10-03 PROCEDURE — G0463 HOSPITAL OUTPT CLINIC VISIT: HCPCS

## 2024-10-03 PROCEDURE — 82040 ASSAY OF SERUM ALBUMIN: CPT | Performed by: NURSE PRACTITIONER

## 2024-10-03 PROCEDURE — 36415 COLL VENOUS BLD VENIPUNCTURE: CPT | Performed by: NURSE PRACTITIONER

## 2024-10-03 PROCEDURE — 99214 OFFICE O/P EST MOD 30 MIN: CPT | Performed by: NURSE PRACTITIONER

## 2024-10-03 RX ORDER — LISINOPRIL AND HYDROCHLOROTHIAZIDE 10; 12.5 MG/1; MG/1
1 TABLET ORAL DAILY
Qty: 14 TABLET | Refills: 0 | Status: SHIPPED | OUTPATIENT
Start: 2024-10-03 | End: 2024-10-07

## 2024-10-03 ASSESSMENT — ACTIVITIES OF DAILY LIVING (ADL)
ADLS_ACUITY_SCORE: 35
ADLS_ACUITY_SCORE: 35

## 2024-10-03 NOTE — DISCHARGE INSTRUCTIONS
Recommend that you are seen at the next available primary care visit available.  Reasons that primary care would be beneficial would be management of hypertension, type 2 diabetes(you have elevated blood sugar today of 130), additional follow-up labs would be recommended.  Recommend testing cholesterol.  Also recommend follow-up echocardiogram.

## 2024-10-03 NOTE — ED TRIAGE NOTES
Phone call out to Dr. Michael's team to speak with NADIA Gomez. Care team sent message to provider, and someone will call back.    Bora Calderon RN on 10/3/2024 at 2:04 PM

## 2024-10-03 NOTE — TELEPHONE ENCOUNTER
Called and spoke with ED provider Luann Lo CNP. Luann is very specific in wanting patient to follow up with Dr. Michael for managing patient's blood pressure, says patient went into dentist and the dentist would not work on him due to elevated BP of 200/111 so patient went to ED today. Dylan says patient is not compliant with medications, does not see a provider frequently, has a history of PE. Labs were abnormal with creatinine and blood sugar, Luann hopes for a cardiology referral and work up for diabetes.    Called patient and scheduled patient with Dr. Michael for Monday at 4:20 and scheduled for 40 minutes to establish care and blood pressure follow up.     Will send update only to Dr. Michael.      FREDIS Pack

## 2024-10-03 NOTE — TELEPHONE ENCOUNTER
Reason for Call:  Appointment Request    Patient requesting this type of appt:  Urgent Care Follow-Up     Requested provider:  Fernanda    Reason patient unable to be scheduled: Not within requested timeframe    When does patient want to be seen/preferred time: 3-7 days    Comments: Pt was seen at [facility] today, 10/3.  The provider, Annalise Colmenares,  who saw patient would like him to follow up with Dr Michael within a week.  Annalise would like to talk to the care team as well to fill them in on why she would like Fernanda to follow up with patient.  Can reach Annalise Colmenares at [phone], nurse is Bora.      Call taken on 10/3/2024 at 2:03 PM by hSeba Martin

## 2024-10-03 NOTE — ED TRIAGE NOTES
"Pt reports he has a history of high BP I\"a few years ago\", but that he was placed on medication for it, then his BP became below normal.   Went to have a dentist procedure today, and they would not complete the procedure due to his increased BP.      Bora Calderon RN on 10/3/2024 at 12:23 PM    "

## 2024-10-04 NOTE — ED PROVIDER NOTES
ED Provider Note  [facility]      History   No chief complaint on file.    HPI  Rafita Herrera is a 58 year old male who presents today with severely elevated blood pressure patient declines to be seen in the emergency department.  Denies shortness of breath, fatigue, headache, lightheadedness or chest pain.  Patient reports that he was seen in his dental clinic to have a feeling repaired in one of his molars and the dentist took his blood pressure repeated blood pressures because he has bursts of fairly elevated they were unable to treat the patient advised that he be seen in a primary care clinic for blood pressure management as soon as possible.  Patient presents today because he needs dental care and they were unable to work on his teeth until he has his blood pressure managed.  Patient has a history of being started on blood pressure medicine 15 years ago but reports that he only took the medication for approximately 1 month reports that he does not like taking pills.  Patient was seen and admitted for COVID and a pulmonary embolism several years ago started on a blood thinner and seen by cardiology.  At that time it was recommended that he have annual follow-up and they advised that he manage his hypertension and hyperlipidemia with medications patient declined.  Patient currently does not have a primary care provider he is open to the idea of seeing a primary care provider at the next available appointment in the [place] location.              Allergies:  No Known Allergies    Problem List:    Patient Active Problem List    Diagnosis Date Noted    Multiple subsegmental pulmonary emboli without acute cor pulmonale (H) 11/24/2021     Priority: Medium    Acute respiratory failure with hypoxia (H) 11/23/2021     Priority: Medium    Acute pulmonary embolism without acute cor pulmonale (H) 11/23/2021     Priority: Medium    Pneumonia due to 2019 novel coronavirus 11/23/2021     Priority:  Medium    Hyperlipidemia with target LDL less than 130 01/22/2014     Priority: Medium     Diagnosis updated by automated process. Provider to review and confirm.      Hypertension 10/14/2013     Priority: Medium     Atenolol caused bradycardia at 50 mg daily. Hytrin likely caused side effects.           Past Medical History:    Past Medical History:   Diagnosis Date    Acute upper respiratory infections of unspecified site     Lumbago     Vitiligo        Past Surgical History:    No past surgical history on file.    Family History:    Family History   Problem Relation Age of Onset    Lipids Father     Allergies Father         HAYFEVER    Heart Disease Father         bypass and stents starting at age 50.    Allergies Sister         CATS    Allergies Sister         HAYFEVER    Hypertension Mother         Taking generic Lopressor.    Alzheimer Disease Other         POSSIBLY AUNT ON FATHERS SIDE       Social History:  Marital Status:   [2]  Social History     Tobacco Use    Smoking status: Never    Smokeless tobacco: Never   Substance Use Topics    Alcohol use: Yes     Comment: socially- less than 7 drinks per week.    Drug use: No        Medications:    lisinopril-hydrochlorothiazide (ZESTORETIC) 10-12.5 MG tablet  apixaban ANTICOAGULANT (ELIQUIS) 5 MG tablet          Review of Systems  A medically appropriate review of systems was performed with pertinent positives and negatives noted in the HPI, and all other systems negative.    Physical Exam   Patient Vitals for the past 24 hrs:   BP Temp Temp src Pulse Resp SpO2   10/03/24 1344 (!) 188/98 -- -- -- -- --   10/03/24 1220 (!) 199/113 98.5  F (36.9  C) Oral 84 18 99 %          Physical Exam  General: alert and in no acute distress on arrival  Ears/Nose/Throat: Left Ear: TM intact, middle ear is not erythremic, no purulence, canal patent. Right Ear: TM intact, middle ear is not erythremic, no purulence, canal patent. Nose: No erythema or edema patent nostrils  bilateral. Throat: midline uvula, non-erythremic, non-enlarged tonsils, without exudate.  No cervical adenopathy.   Head: atraumatic, normocephalic  Eyes:  non-traumatic.  Left Eyes: Non-reddened conjunctiva, no icterus, noninjected, normal pupillary response to light. Normal extraocular movement.  Right eye: Non-reddened conjunctiva, no icterus, noninjected, normal pupillary response to light. Normal extraocular movement bilateral. No drainage present.   Lungs:  nonlabored, CTA bilateral throughout.  CV: Regular rate and rhythm, blood pressures are very elevated patient is asymptomatic, extremities warm and perfused.   Abd: nondistended, no pulsatile aorta.  Nontender, no HSM palpated.  Skin: no rashes, no diaphoresis and skin color normal  Neuro: Patient awake, alert, speech is fluent, no focal deficits  Psychiatric: affect/mood normal, appropriate historian      ED Course                 Procedures                    Results for orders placed or performed during the hospital encounter of 10/03/24 (from the past 48 hour(s))   Comprehensive metabolic panel   Result Value Ref Range    Sodium 137 135 - 145 mmol/L    Potassium 4.9 3.4 - 5.3 mmol/L    Carbon Dioxide (CO2) 28 22 - 29 mmol/L    Anion Gap 8 7 - 15 mmol/L    Urea Nitrogen 11.6 6.0 - 20.0 mg/dL    Creatinine 1.22 (H) 0.67 - 1.17 mg/dL    GFR Estimate 69 >60 mL/min/1.73m2    Calcium 9.4 8.8 - 10.4 mg/dL    Chloride 101 98 - 107 mmol/L    Glucose 130 (H) 70 - 99 mg/dL    Alkaline Phosphatase 91 40 - 150 U/L    AST 34 0 - 45 U/L    ALT 35 0 - 70 U/L    Protein Total 7.6 6.4 - 8.3 g/dL    Albumin 4.4 3.5 - 5.2 g/dL    Bilirubin Total 0.7 <=1.2 mg/dL       MEDICATIONS GIVEN IN THE EMERGENCY DEPARTMENT:  Medications - No data to display             Assessments & Plan (with Medical Decision Making)  58 year old male who presents to the Urgent Care for evaluation of unmanaged hypertension patient is asymptomatic.  Denied any chest pain, shortness of breath,  fatigue, vision changes, headaches.  Seen in the dental clinic had blood pressure taken and retaken with very elevated readings I declined to treat him  due to his elevated blood pressure readings. CMP ordered, normal testing other than elevated glucose at 130. Ordered Lisinopril 20mg/hydrochlorothiazide, requested he follow up next week in PC. A PC consult was ordered and he will see Dr. Michael next week. Reviewed labs and recommendations for HTN management with patient and long term complications of not treating HTN and elevated glucose readings if additional labs show that he has some Type II DM.   A verbal review of findings given to Dr. Michael's nurse who scheduled patient to be seen next week.         Patient verbalized agreement with and understanding of the rational for the diagnosis and treatment plan.  All questions were answered to best of my ability and the patient's satisfaction. The patient was advised to contact or return to their primary clinic or UC/ED with any questions that may arise after this visit.       I have reviewed the nursing notes.    I have reviewed the findings, diagnosis, plan and need for follow up with the patient.        NEW PRESCRIPTIONS STARTED AT TODAY'S ER VISIT  Discharge Medication List as of 10/3/2024  2:12 PM        START taking these medications    Details   lisinopril-hydrochlorothiazide (ZESTORETIC) 10-12.5 MG tablet Take 1 tablet by mouth daily for 14 days., Disp-14 tablet, R-0, E-Prescribe             Final diagnoses:   Resistant hypertension       10/3/2024   [facility] EMERGENCY DEPT    Disclaimer: This note consists of symbols derived from keyboarding, dictation, and/or voice recognition software. As a result, there may be errors in the script that have gone undetected.  Please consider this when interpreting information found in the chart.      Luann Lo, CASH CNP  10/08/24 1037

## 2024-10-07 ENCOUNTER — OFFICE VISIT (OUTPATIENT)
Dept: FAMILY MEDICINE | Facility: CLINIC | Age: 59
End: 2024-10-07
Payer: COMMERCIAL

## 2024-10-07 VITALS
DIASTOLIC BLOOD PRESSURE: 92 MMHG | SYSTOLIC BLOOD PRESSURE: 158 MMHG | WEIGHT: 234 LBS | TEMPERATURE: 98.6 F | RESPIRATION RATE: 16 BRPM | BODY MASS INDEX: 31.69 KG/M2 | HEART RATE: 74 BPM | HEIGHT: 72 IN | OXYGEN SATURATION: 100 %

## 2024-10-07 DIAGNOSIS — I10 BENIGN ESSENTIAL HYPERTENSION: Primary | ICD-10-CM

## 2024-10-07 DIAGNOSIS — Z12.11 SCREEN FOR COLON CANCER: ICD-10-CM

## 2024-10-07 DIAGNOSIS — E78.5 HYPERLIPIDEMIA WITH TARGET LDL LESS THAN 130: ICD-10-CM

## 2024-10-07 DIAGNOSIS — R73.09 ELEVATED GLUCOSE: ICD-10-CM

## 2024-10-07 PROCEDURE — 99214 OFFICE O/P EST MOD 30 MIN: CPT | Mod: 25 | Performed by: FAMILY MEDICINE

## 2024-10-07 PROCEDURE — 90715 TDAP VACCINE 7 YRS/> IM: CPT | Performed by: FAMILY MEDICINE

## 2024-10-07 PROCEDURE — 90471 IMMUNIZATION ADMIN: CPT | Performed by: FAMILY MEDICINE

## 2024-10-07 RX ORDER — LISINOPRIL AND HYDROCHLOROTHIAZIDE 10; 12.5 MG/1; MG/1
1 TABLET ORAL DAILY
Qty: 30 TABLET | Refills: 0 | Status: SHIPPED | OUTPATIENT
Start: 2024-10-07 | End: 2024-10-31

## 2024-10-07 ASSESSMENT — PAIN SCALES - GENERAL: PAINLEVEL: NO PAIN (0)

## 2024-10-07 NOTE — PROGRESS NOTES
Rafita was seen today for hypertension.    Diagnoses and all orders for this visit:    Benign essential hypertension  -     lisinopril-hydrochlorothiazide (ZESTORETIC) 10-12.5 MG tablet; Take 1 tablet by mouth daily.  -     Basic metabolic panel  (Ca, Cl, CO2, Creat, Gluc, K, Na, BUN); Future  -     Asked to watch salt in diet.    Screen for colon cancer  -     Fecal colorectal cancer screen (FIT); Future  -      Patient will be notified of results.    Hyperlipidemia with target LDL less than 130  -     Lipid panel reflex to direct LDL Fasting; Future    Elevated glucose  -     Hemoglobin A1c; Future    Other orders  -     TDAP 10-64Y (ADACEL,BOOSTRIX)           Patient is a 58-year-old male presenting to the clinic today for a follow-up from the urgent care.  He was seen for elevated blood pressure after being to his dentist and his blood pressure was greater than 180 systolic.        He reports over 10 years ago he was told he had mild hypertension and was started on medication.  He did mention that during that period he was going through a divorce and he believes it was more circumstantial.  He only took the blood pressure medicine for about a month at that time.      He reports no headaches, no blurry vision, no chest pain or shortness of breath.      We discussed blood pressure management in general.  I recommended given that his blood pressure has been elevated that we start him on medication.  He was started on medication in the emergency room.  I did refill it and asked him to come in for a nurse visit in a couple of weeks with blood work.      Patient has expressed desire to manage blood pressure with lifestyle.  I also agreed with the patient but I recommended he starts medication at this time since his blood pressure is quite elevated.    If it continues to drop and his lifestyle helps we may either reduce the dose or stop the medication altogether.      Patient is aware of this.  Patient has not had  any form of colon cancer screening but is open to getting a FIT test done.  All questions were answered patient voiced understanding of the plan.    Kori Eller is a 58 year old, presenting for the following health issues:      Hypertension        10/7/2024     4:37 PM   Additional Questions   Roomed by Renee ENGLE     History of Present Illness       Reason for visit:  High blood pressure  Symptom onset:  3-7 days ago   He is taking medications regularly.         Hypertension Follow-up    Do you check your blood pressure regularly outside of the clinic? Is able to if needed    Are you following a low salt diet? No  Are your blood pressures ever more than 140 on the top number (systolic) OR more   than 90 on the bottom number (diastolic), for example 140/90? Yes        Review of Systems  Constitutional, HEENT, cardiovascular, pulmonary, gi and gu systems are negative, except as otherwise noted.      Objective    BP (!) 158/90   Pulse 74   Temp 98.6  F (37  C) (Tympanic)   Resp 16   Ht 1.829 m (6')   Wt 106.1 kg (234 lb)   SpO2 100%   BMI 31.74 kg/m    Body mass index is 31.74 kg/m .  Physical Exam   GENERAL: alert and no distress  EYES: Eyes grossly normal to inspection, PERRL and conjunctivae and sclerae normal  HENT: ear canals and TM's normal, nose and mouth without ulcers or lesions  NECK: no adenopathy, no asymmetry, masses, or scars  RESP: lungs clear to auscultation - no rales, rhonchi or wheezes  CV: regular rate and rhythm, normal S1 S2, no S3 or S4, no murmur, click or rub, no peripheral edema  ABDOMEN: soft, nontender, no hepatosplenomegaly, no masses and bowel sounds normal  MS: no gross musculoskeletal defects noted, no edema            Signed Electronically by: Tanisha Michael MD

## 2024-10-07 NOTE — NURSING NOTE
Chief Complaint   Patient presents with    Hypertension       Initial BP (!) 158/90   Pulse 74   Temp 98.6  F (37  C) (Tympanic)   Resp 16   Ht 1.829 m (6')   Wt 106.1 kg (234 lb)   SpO2 100%   BMI 31.74 kg/m   Estimated body mass index is 31.74 kg/m  as calculated from the following:    Height as of this encounter: 1.829 m (6').    Weight as of this encounter: 106.1 kg (234 lb).    Patient presents to the clinic using No DME    Is there anyone who you would like to be able to receive your results? No  If yes have patient fill out CIP[facility]O

## 2024-10-22 ENCOUNTER — ALLIED HEALTH/NURSE VISIT (OUTPATIENT)
Dept: FAMILY MEDICINE | Facility: CLINIC | Age: 59
End: 2024-10-22
Payer: COMMERCIAL

## 2024-10-22 ENCOUNTER — TELEPHONE (OUTPATIENT)
Dept: FAMILY MEDICINE | Facility: CLINIC | Age: 59
End: 2024-10-22

## 2024-10-22 ENCOUNTER — LAB (OUTPATIENT)
Dept: LAB | Facility: CLINIC | Age: 59
End: 2024-10-22
Payer: COMMERCIAL

## 2024-10-22 VITALS — DIASTOLIC BLOOD PRESSURE: 78 MMHG | SYSTOLIC BLOOD PRESSURE: 124 MMHG

## 2024-10-22 DIAGNOSIS — E78.5 HYPERLIPIDEMIA WITH TARGET LDL LESS THAN 130: ICD-10-CM

## 2024-10-22 DIAGNOSIS — I10 BENIGN ESSENTIAL HYPERTENSION: Primary | ICD-10-CM

## 2024-10-22 DIAGNOSIS — R73.09 ELEVATED GLUCOSE: ICD-10-CM

## 2024-10-22 DIAGNOSIS — I10 BENIGN ESSENTIAL HYPERTENSION: ICD-10-CM

## 2024-10-22 LAB
ANION GAP SERPL CALCULATED.3IONS-SCNC: 11 MMOL/L (ref 7–15)
BUN SERPL-MCNC: 19.7 MG/DL (ref 8–23)
CALCIUM SERPL-MCNC: 9.2 MG/DL (ref 8.8–10.4)
CHLORIDE SERPL-SCNC: 103 MMOL/L (ref 98–107)
CHOLEST SERPL-MCNC: 190 MG/DL
CREAT SERPL-MCNC: 1.36 MG/DL (ref 0.67–1.17)
EGFRCR SERPLBLD CKD-EPI 2021: 60 ML/MIN/1.73M2
EST. AVERAGE GLUCOSE BLD GHB EST-MCNC: 128 MG/DL
FASTING STATUS PATIENT QL REPORTED: YES
FASTING STATUS PATIENT QL REPORTED: YES
GLUCOSE SERPL-MCNC: 95 MG/DL (ref 70–99)
HBA1C MFR BLD: 6.1 % (ref 0–5.6)
HCO3 SERPL-SCNC: 24 MMOL/L (ref 22–29)
HDLC SERPL-MCNC: 38 MG/DL
LDLC SERPL CALC-MCNC: 127 MG/DL
NONHDLC SERPL-MCNC: 152 MG/DL
POTASSIUM SERPL-SCNC: 4.5 MMOL/L (ref 3.4–5.3)
SODIUM SERPL-SCNC: 138 MMOL/L (ref 135–145)
TRIGL SERPL-MCNC: 125 MG/DL

## 2024-10-22 PROCEDURE — 80061 LIPID PANEL: CPT

## 2024-10-22 PROCEDURE — 83036 HEMOGLOBIN GLYCOSYLATED A1C: CPT

## 2024-10-22 PROCEDURE — 36415 COLL VENOUS BLD VENIPUNCTURE: CPT

## 2024-10-22 PROCEDURE — 99207 PR NO CHARGE NURSE ONLY: CPT

## 2024-10-22 PROCEDURE — 80048 BASIC METABOLIC PNL TOTAL CA: CPT

## 2024-10-22 NOTE — PROGRESS NOTES
Rafita Herrera is a 59 year old year old patient who comes in today for a Blood Pressure check because of new medication.    Pt established care with Dr Michael on 10/7/24 and was started on lisinopril-hydrochlorothiazide for blood pressure.    Vital Signs as repeated by RN:  BP:  126/82  Rechecked 5 min later and BP is 124/78    Patient is taking medication as prescribed  Patient is tolerating medications well.    Patient is not monitoring Blood Pressure at home.  Pt has a BP machine at home and will begin checking home BP readings a couple times a week.    Current complaints: light-headedness.  Occasional and not bothersome.  Pt says there was one day that he felt more light headed but otherwise the lightheadedness has not been a problem.  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  Lightheadedness occurred briefly with quick upward movements.    Disposition:  Routed to provider for further instructions.      Pharmacy, [facility].  Will need refilled if continues at this dose.    Also, pt stopped at lab this morning and completed fasting lab work.    Kellie Danielson RN

## 2024-10-22 NOTE — TELEPHONE ENCOUNTER
Rafita Herrera is a 59 year old year old patient who comes in today for a Blood Pressure check because of new medication.     Pt established care with Dr Michael on 10/7/24 and was started on lisinopril-hydrochlorothiazide for blood pressure.     Vital Signs as repeated by RN:  BP:  126/82  Rechecked 5 min later and BP is 124/78     Patient is taking medication as prescribed  Patient is tolerating medications well.    Patient is not monitoring Blood Pressure at home.  Pt has a BP machine at home and will begin checking home BP readings a couple times a week.     Current complaints: light-headedness.  Occasional and not bothersome.  Pt says there was one day that he felt more light headed but otherwise the lightheadedness has not been a problem.  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  Lightheadedness occurred briefly with quick upward movements.     Disposition:  Routed to provider for further instructions.       Pharmacy, [facility].  Will need refilled if continues at this dose.     Also, pt stopped at lab this morning and completed fasting lab work.     Kellie Danielson RN

## 2024-10-22 NOTE — TELEPHONE ENCOUNTER
Blood pressure noted. Patient should be aggressive with hydration. That could improve the lightheadedness. If that does not improve we should take a look at his medication dose.  Patient should keep us informed.

## 2024-10-22 NOTE — RESULT ENCOUNTER NOTE
Please inform patient that test result was within normal parameters except slight increase in Cr. Recommend increasing fluids.Cholesterol was also slightly high.  Recommend medication to lower the cholesterol.    Thank you.     Tanisha Michael M.D.

## 2024-10-31 RX ORDER — LISINOPRIL AND HYDROCHLOROTHIAZIDE 10; 12.5 MG/1; MG/1
1 TABLET ORAL DAILY
Qty: 90 TABLET | Refills: 3 | Status: SHIPPED | OUTPATIENT
Start: 2024-10-31

## 2025-05-08 ENCOUNTER — TELEPHONE (OUTPATIENT)
Dept: FAMILY MEDICINE | Facility: CLINIC | Age: 60
End: 2025-05-08
Payer: COMMERCIAL

## 2025-05-08 NOTE — TELEPHONE ENCOUNTER
Patient Quality Outreach    Patient is due for the following:   Colon Cancer Screening  Physical Preventive Adult Physical    Action(s) Taken:   Schedule a Adult Preventative    Type of outreach:    Sent letter.    Questions for provider review:    None         Kathryn Haley, [facility]  Chart routed to none, letter sent.

## 2025-05-08 NOTE — LETTER
May 8, 2025    Rafita LÁZARO Herrera    [address] [place] [id number]    Hello,     Your care team at [facility] values your health and well-being. After reviewing your chart, we have identified recommendation(s) to help you better manage your health.    It's time for your Annual Wellness visit. During your visit, we'll discuss your health, well-being, and any questions you may have related to preventive care. We'll also review any recommended tests, exams, or screenings you might need. To schedule please call your clinic at [phone] or use your [employer] account.    It's time for a follow-up visit to manage your Colon Cancer Screening.    If you recently had or are having any of these services soon, please contact the clinic via phone or [facility] so that your care team can update your records.    We look forward to seeing you at your upcoming visit.    If you have any questions or concerns, please contact our clinic. Thank you for continuing to trust us with your care.    Sincerely,    Your [facility] Care Team            Electronically signed